# Patient Record
Sex: MALE | Race: WHITE | Employment: UNEMPLOYED | URBAN - METROPOLITAN AREA
[De-identification: names, ages, dates, MRNs, and addresses within clinical notes are randomized per-mention and may not be internally consistent; named-entity substitution may affect disease eponyms.]

---

## 2024-10-17 ENCOUNTER — HOSPITAL ENCOUNTER (INPATIENT)
Facility: HOSPITAL | Age: 39
LOS: 4 days | Discharge: HOME | End: 2024-10-21
Attending: EMERGENCY MEDICINE | Admitting: HOSPITALIST
Payer: COMMERCIAL

## 2024-10-17 ENCOUNTER — APPOINTMENT (OUTPATIENT)
Dept: GENERAL RADIOLOGY | Facility: HOSPITAL | Age: 39
End: 2024-10-17
Attending: EMERGENCY MEDICINE
Payer: COMMERCIAL

## 2024-10-17 DIAGNOSIS — R07.9 CHEST PAIN OF UNCERTAIN ETIOLOGY: Primary | ICD-10-CM

## 2024-10-17 DIAGNOSIS — F10.10 ALCOHOL ABUSE: ICD-10-CM

## 2024-10-17 PROBLEM — F10.939 ALCOHOL WITHDRAWAL (HCC): Status: ACTIVE | Noted: 2024-10-17

## 2024-10-17 LAB
ALBUMIN SERPL-MCNC: 5 G/DL (ref 3.2–4.8)
ALP LIVER SERPL-CCNC: 109 U/L
ALT SERPL-CCNC: 41 U/L
ANION GAP SERPL CALC-SCNC: 15 MMOL/L (ref 0–18)
AST SERPL-CCNC: 44 U/L (ref ?–34)
ATRIAL RATE: 92 BPM
BASOPHILS # BLD AUTO: 0.05 X10(3) UL (ref 0–0.2)
BASOPHILS NFR BLD AUTO: 0.3 %
BILIRUB DIRECT SERPL-MCNC: 0.3 MG/DL (ref ?–0.3)
BILIRUB SERPL-MCNC: 0.8 MG/DL (ref 0.3–1.2)
BUN BLD-MCNC: 12 MG/DL (ref 9–23)
BUN/CREAT SERPL: 10.7 (ref 10–20)
CALCIUM BLD-MCNC: 9.4 MG/DL (ref 8.7–10.4)
CHLORIDE SERPL-SCNC: 104 MMOL/L (ref 98–112)
CO2 SERPL-SCNC: 22 MMOL/L (ref 21–32)
CREAT BLD-MCNC: 1.12 MG/DL
DEPRECATED RDW RBC AUTO: 42.9 FL (ref 35.1–46.3)
EGFRCR SERPLBLD CKD-EPI 2021: 85 ML/MIN/1.73M2 (ref 60–?)
EOSINOPHIL # BLD AUTO: 0.06 X10(3) UL (ref 0–0.7)
EOSINOPHIL NFR BLD AUTO: 0.4 %
ERYTHROCYTE [DISTWIDTH] IN BLOOD BY AUTOMATED COUNT: 13.2 % (ref 11–15)
GLUCOSE BLD-MCNC: 111 MG/DL (ref 70–99)
HCT VFR BLD AUTO: 48.3 %
HGB BLD-MCNC: 17 G/DL
IMM GRANULOCYTES # BLD AUTO: 0.06 X10(3) UL (ref 0–1)
IMM GRANULOCYTES NFR BLD: 0.4 %
LIPASE SERPL-CCNC: 50 U/L (ref 12–53)
LYMPHOCYTES # BLD AUTO: 3.89 X10(3) UL (ref 1–4)
LYMPHOCYTES NFR BLD AUTO: 25.9 %
MAGNESIUM SERPL-MCNC: 2.2 MG/DL (ref 1.6–2.6)
MCH RBC QN AUTO: 30.9 PG (ref 26–34)
MCHC RBC AUTO-ENTMCNC: 35.2 G/DL (ref 31–37)
MCV RBC AUTO: 87.8 FL
MONOCYTES # BLD AUTO: 1.21 X10(3) UL (ref 0.1–1)
MONOCYTES NFR BLD AUTO: 8 %
NEUTROPHILS # BLD AUTO: 9.77 X10 (3) UL (ref 1.5–7.7)
NEUTROPHILS # BLD AUTO: 9.77 X10(3) UL (ref 1.5–7.7)
NEUTROPHILS NFR BLD AUTO: 65 %
OSMOLALITY SERPL CALC.SUM OF ELEC: 292 MOSM/KG (ref 275–295)
P AXIS: 47 DEGREES
P-R INTERVAL: 146 MS
PLATELET # BLD AUTO: 349 10(3)UL (ref 150–450)
POTASSIUM SERPL-SCNC: 4.1 MMOL/L (ref 3.5–5.1)
PROT SERPL-MCNC: 8.1 G/DL (ref 5.7–8.2)
Q-T INTERVAL: 372 MS
QRS DURATION: 84 MS
QTC CALCULATION (BEZET): 460 MS
R AXIS: 67 DEGREES
RBC # BLD AUTO: 5.5 X10(6)UL
SODIUM SERPL-SCNC: 141 MMOL/L (ref 136–145)
T AXIS: 60 DEGREES
TROPONIN I SERPL HS-MCNC: 6 NG/L
TROPONIN I SERPL HS-MCNC: 7 NG/L
VENTRICULAR RATE: 92 BPM
WBC # BLD AUTO: 15 X10(3) UL (ref 4–11)

## 2024-10-17 PROCEDURE — 71045 X-RAY EXAM CHEST 1 VIEW: CPT | Performed by: EMERGENCY MEDICINE

## 2024-10-17 PROCEDURE — 99222 1ST HOSP IP/OBS MODERATE 55: CPT | Performed by: HOSPITALIST

## 2024-10-17 RX ORDER — LORAZEPAM 2 MG/ML
6 INJECTION INTRAMUSCULAR EVERY 10 MIN PRN
Status: DISCONTINUED | OUTPATIENT
Start: 2024-10-17 | End: 2024-10-19

## 2024-10-17 RX ORDER — LORAZEPAM 1 MG/1
3 TABLET ORAL
Status: DISCONTINUED | OUTPATIENT
Start: 2024-10-17 | End: 2024-10-19

## 2024-10-17 RX ORDER — LORAZEPAM 2 MG/ML
5 INJECTION INTRAMUSCULAR
Status: DISCONTINUED | OUTPATIENT
Start: 2024-10-17 | End: 2024-10-19

## 2024-10-17 RX ORDER — DOXEPIN HYDROCHLORIDE 50 MG/1
1 CAPSULE ORAL DAILY
Status: DISCONTINUED | OUTPATIENT
Start: 2024-10-17 | End: 2024-10-21

## 2024-10-17 RX ORDER — LORAZEPAM 2 MG/ML
1 INJECTION INTRAMUSCULAR
Status: DISCONTINUED | OUTPATIENT
Start: 2024-10-17 | End: 2024-10-19

## 2024-10-17 RX ORDER — FOLIC ACID 1 MG/1
1 TABLET ORAL DAILY
Status: DISCONTINUED | OUTPATIENT
Start: 2024-10-17 | End: 2024-10-21

## 2024-10-17 RX ORDER — ACETAMINOPHEN 500 MG
500 TABLET ORAL EVERY 4 HOURS PRN
Status: DISCONTINUED | OUTPATIENT
Start: 2024-10-17 | End: 2024-10-21

## 2024-10-17 RX ORDER — MELATONIN
100 DAILY
Status: DISCONTINUED | OUTPATIENT
Start: 2024-10-18 | End: 2024-10-21

## 2024-10-17 RX ORDER — LORAZEPAM 2 MG/ML
2 INJECTION INTRAMUSCULAR
Status: DISCONTINUED | OUTPATIENT
Start: 2024-10-17 | End: 2024-10-19

## 2024-10-17 RX ORDER — ONDANSETRON 2 MG/ML
4 INJECTION INTRAMUSCULAR; INTRAVENOUS EVERY 6 HOURS PRN
Status: DISCONTINUED | OUTPATIENT
Start: 2024-10-17 | End: 2024-10-18

## 2024-10-17 RX ORDER — KETOROLAC TROMETHAMINE 15 MG/ML
15 INJECTION, SOLUTION INTRAMUSCULAR; INTRAVENOUS ONCE
Status: COMPLETED | OUTPATIENT
Start: 2024-10-17 | End: 2024-10-17

## 2024-10-17 RX ORDER — FAMOTIDINE 10 MG/ML
20 INJECTION, SOLUTION INTRAVENOUS ONCE
Status: COMPLETED | OUTPATIENT
Start: 2024-10-17 | End: 2024-10-17

## 2024-10-17 RX ORDER — THIAMINE HYDROCHLORIDE 100 MG/ML
100 INJECTION, SOLUTION INTRAMUSCULAR; INTRAVENOUS ONCE
Status: COMPLETED | OUTPATIENT
Start: 2024-10-17 | End: 2024-10-17

## 2024-10-17 RX ORDER — PROPRANOLOL HYDROCHLORIDE 10 MG/1
10 TABLET ORAL 2 TIMES DAILY
COMMUNITY

## 2024-10-17 RX ORDER — LORAZEPAM 2 MG/ML
3 INJECTION INTRAMUSCULAR
Status: DISCONTINUED | OUTPATIENT
Start: 2024-10-17 | End: 2024-10-19

## 2024-10-17 RX ORDER — PROPRANOLOL HYDROCHLORIDE 10 MG/1
10 TABLET ORAL 2 TIMES DAILY
Status: DISCONTINUED | OUTPATIENT
Start: 2024-10-17 | End: 2024-10-21

## 2024-10-17 RX ORDER — METOPROLOL TARTRATE 25 MG/1
25 TABLET, FILM COATED ORAL 2 TIMES DAILY PRN
Status: DISCONTINUED | OUTPATIENT
Start: 2024-10-17 | End: 2024-10-20

## 2024-10-17 RX ORDER — LORAZEPAM 0.5 MG/1
1 TABLET ORAL
Status: DISCONTINUED | OUTPATIENT
Start: 2024-10-17 | End: 2024-10-20

## 2024-10-17 RX ORDER — LORAZEPAM 2 MG/ML
4 INJECTION INTRAMUSCULAR EVERY 30 MIN PRN
Status: DISCONTINUED | OUTPATIENT
Start: 2024-10-17 | End: 2024-10-19

## 2024-10-17 RX ORDER — LORAZEPAM 0.5 MG/1
2 TABLET ORAL
Status: DISCONTINUED | OUTPATIENT
Start: 2024-10-17 | End: 2024-10-19

## 2024-10-17 RX ORDER — ENOXAPARIN SODIUM 100 MG/ML
40 INJECTION SUBCUTANEOUS DAILY
Status: DISCONTINUED | OUTPATIENT
Start: 2024-10-17 | End: 2024-10-21

## 2024-10-17 RX ORDER — LORAZEPAM 2 MG/ML
2 INJECTION INTRAMUSCULAR ONCE
Status: COMPLETED | OUTPATIENT
Start: 2024-10-17 | End: 2024-10-17

## 2024-10-17 RX ORDER — PROCHLORPERAZINE EDISYLATE 5 MG/ML
5 INJECTION INTRAMUSCULAR; INTRAVENOUS EVERY 8 HOURS PRN
Status: DISCONTINUED | OUTPATIENT
Start: 2024-10-17 | End: 2024-10-21

## 2024-10-17 RX ORDER — LORAZEPAM 0.5 MG/1
1 TABLET ORAL
Status: DISCONTINUED | OUTPATIENT
Start: 2024-10-17 | End: 2024-10-19

## 2024-10-17 NOTE — ED INITIAL ASSESSMENT (HPI)
Pt presented to ED c/o chest pain with lightheadedness, nausea, vomiting. Lower abdominal pain. EMS stated pt was staying at a motel and drank 2 pints vodka and began to vomit and began to experience chest pain since 0800 hrs this morning. EMS gave 324 mg ASA and 1 tab of sublingual nitroglycerin.

## 2024-10-17 NOTE — ED QUICK NOTES
Orders for admission, patient is aware of plan and ready to go upstairs. Any questions, please call ED RN Kimberly at extension [phone].     Patient Covid vaccination status: Unvaccinated     COVID Test Ordered in ED: None    COVID Suspicion at Admission: N/A    Running Infusions:  None    Mental Status/LOC at time of transport: A/Ox4. Family at bedside. Independent.     Other pertinent information:   CIWA score: 13   NIH score:  N/A

## 2024-10-17 NOTE — CM/SW NOTE
10/17/24 1520   SDOH Outreach Status   SDOH Outreach Status Complete   SDOH Referred for Transportation;Food Resource;Utilities;Stress/Depression/Anxiety;Tobacco/Alcohol/Substance   SDOH Resource Details   SDOH Resource (Transportation) [facility], [facility]   SDOH Resource (Food) [employer], [employer], [facility], [facility], [facility] Food Pantry, [employer], [facility], [employer], SNAP, TANF   SDOH Resource (Utilities) [employer], TANF, [employer], [facility] Housing Forward, [facility], [place] [facility] [employer]   SDOH Resource (Stress/Mental Health) Psych liaison on consult   SDOH Resource (Tobacco/Alcohol/Substance) [place] Quit Hotline, [employer] Help, [employer]       SW received MDO for SDOH & DC Planning. Per chart, pt indicated needs for Food Insecurity, Transportation, Utilities, Stress/Depression, and Tobacco use.    KYLE printed all the above resources from [employer] and provided to pt at bedside.    Address verified on Facesheet. Pt confirmed living w/ his mother.  Pt confirmed being completely independent w/ ambulation. Pt reports he still works Full Time. Pt denies driving at this time.    Pt confirmed no other needs currently. SW provided him w/ information on how to reach SW while at [facility] if other needs arise.      MS TejalW, LSW [phone]

## 2024-10-17 NOTE — H&P
[facility]    PATIENT'S NAME: AJ AVITIA   ATTENDING PHYSICIAN: Albertina Conteh MD   PATIENT ACCOUNT#:   [id number]    LOCATION:  [address]  MEDICAL RECORD #:   [id number]       YOB: 1985  ADMISSION DATE:       10/17/2024    HISTORY AND PHYSICAL EXAMINATION    CHIEF COMPLAINT:  Alcohol abuse and alcohol withdrawal.    HISTORY OF PRESENT ILLNESS:  Patient is a 39-year-old  male who presented to the emergency department for evaluation of midsternal chest pain.  He has been on drinking binge since he was released from an alcohol rehab facility 3 days ago.  He has been consuming, according to his report, around 1 gallon of vodka on a daily basis.  Last drink was this morning.  Today, came into the emergency department for alcohol withdrawal but instead he told the ER physician that he has chest pain and he had a stent in the past.  Later on, he retreated his statement, and he was lying about the stent and the chest discomfort.  CBC and chemistry, lipase and troponin were unremarkable except for leukocytosis of 15.  Chest x-ray showed no acute findings.  EKG showed normal sinus rhythm.       PAST MEDICAL HISTORY:  Alcoholism and hypertension.    PAST SURGICAL HISTORY:  None.     ALLERGIES:  No known drug allergies.    FAMILY HISTORY:  Father had alcoholism.     SOCIAL HISTORY:  Smokes 1 pack a day.  No drug use.  Drinks large amount of alcohol on a daily basis, prolonged history of alcoholism.      REVIEW OF SYSTEMS:  Patient is a poor historian, but he reported that he has been feeling jittery and a bit tremulous for the last few hours.  His last drink was this morning.  He has been on a drinking binge for the last 3 days, consuming 1 gallon of vodka daily.  Other 12-point review of systems is negative.       PHYSICAL EXAMINATION:    GENERAL:  Alert and oriented to time, place and person.  A bit anxious.  No acute distress.   VITAL SIGNS:  Temperature 97.9, pulse 96,  respiratory rate 10, blood pressure 147/103, pulse ox 96% on room air.  HEENT:  Atraumatic.  Oropharynx clear.  Dry mucous membranes.  Normal hard and soft palate.  Eyes:  Anicteric sclerae.   NECK:  Supple.  No lymphadenopathy.  Trachea midline.  Full range of motion.   LUNGS:  Clear to auscultation bilaterally.  Normal respiratory effort.    HEART:  Regular rate and rhythm.  S1 and S2 auscultated.  Slightly tachycardic.   ABDOMEN:  Soft, nondistended.  No tenderness.  Positive bowel sounds.   EXTREMITIES:  No peripheral edema, clubbing or cyanosis.   NEUROLOGIC:  Motor and sensory intact.      ASSESSMENT:    1.   Alcohol abuse with high risk for alcohol withdrawal.  2.   Essential hypertension.    PLAN:  Patient will be admitted to general medical floor.  Detox protocol.   evaluation.  According to his guarantor at the bedside, patient is scheduled for inpatient alcohol rehab and transfer to [place] in the next few days.  We will obtain  to evaluate the situation.  Further recommendations to follow.     Dictated By Tracey Rodrigues MD  d: 10/17/2024 11:39:49  t: 10/17/2024 13:01:00  Job [id number]  /

## 2024-10-17 NOTE — PLAN OF CARE
PtShaheed Sanders is A&Ox 4. Patient is ambulating by himself. Patient is experiencing pain. Continent of bowel and bladder. CIWA protocol in place. PRN ativan given. Call light within reach, fall precautions maintained.    Plan is CIWA and psych consult.    Problem: Patient Centered Care  Goal: Patient preferences are identified and integrated in the patient's plan of care  Description: Interventions:  - Provide timely, complete, and accurate information to patient/family  - Incorporate patient and family knowledge, values, beliefs, and cultural backgrounds into the planning and delivery of care  - Encourage patient/family to participate in care and decision-making at the level they choose  - Honor patient and family perspectives and choices  Outcome: Progressing     Problem: SAFETY ADULT - FALL  Goal: Free from fall injury  Description: INTERVENTIONS:  - Assess pt frequently for physical needs  - Identify cognitive and physical deficits and behaviors that affect risk of falls.  - [place] fall precautions as indicated by assessment.  - Educate pt/family on patient safety including physical limitations  - Instruct pt to call for assistance with activity based on assessment  - Modify environment to reduce risk of injury  - Provide assistive devices as appropriate  - Consider OT/PT consult to assist with strengthening/mobility  - Encourage toileting schedule  Outcome: Progressing     Problem: GASTROINTESTINAL - ADULT  Goal: Minimal or absence of nausea and vomiting  Description: INTERVENTIONS:  - Maintain adequate hydration with IV or PO as ordered and tolerated  - Nasogastric tube to low intermittent suction as ordered  - Evaluate effectiveness of ordered antiemetic medications  - Provide nonpharmacologic comfort measures as appropriate  - Advance diet as tolerated, if ordered  - Obtain nutritional consult as needed  - Evaluate fluid balance  Outcome: Progressing  Goal: Maintains adequate nutritional intake  (undernourished)  Description: INTERVENTIONS:  - Monitor percentage of each meal consumed  - Identify factors contributing to decreased intake, treat as appropriate  - Assist with meals as needed  - Monitor I&O, WT and lab values  - Obtain nutritional consult as needed  - Optimize oral hygiene and moisture  - Encourage food from home; allow for food preferences  - Enhance eating environment  Outcome: Progressing     Problem: METABOLIC/FLUID AND ELECTROLYTES - ADULT  Goal: Electrolytes maintained within normal limits  Description: INTERVENTIONS:  - Monitor labs and rhythm and assess patient for signs and symptoms of electrolyte imbalances  - Administer electrolyte replacement as ordered  - Monitor response to electrolyte replacements, including rhythm and repeat lab results as appropriate  - Fluid restriction as ordered  - Instruct patient on fluid and nutrition restrictions as appropriate  Outcome: Progressing

## 2024-10-17 NOTE — ED PROVIDER NOTES
Patient Seen in: [facility] Emergency Department      History     Chief Complaint   Patient presents with    Abdomen/Flank Pain    Chest Pain     Stated Complaint: chest pain    Subjective:   HPI      39-year-old male who reports history of MI 6 months ago presents to the emergency department for evaluation of chest pain.  Patient arrives via EMS, reports chest pain that began early this morning.  Patient reports last night he drank 2 pints of vodka, vomited and chest pain started shortly after this.  Patient reports this is associated with shortness of breath as well as diaphoresis.  He states he had cardiac stents placed at [facility], has not been on any anticoagulant medication since.  He is a daily drinker, reports he has withdrawal symptoms when he stops drinking.    Objective:     Past Medical History:    ETOH abuse    MI (myocardial infarction) (HCC)              History reviewed. No pertinent surgical history.             Social History     Socioeconomic History    Marital status: Single   Tobacco Use    Smoking status: Every Day     Current packs/day: 1.00     Average packs/day: 1 pack/day for 20.8 years (20.8 ttl pk-yrs)     Types: Cigarettes     Start date: 2004    Smokeless tobacco: Never   Vaping Use    Vaping status: Never Used   Substance and Sexual Activity    Alcohol use: Yes     Comment: 2 pints of vodka per day. last drink 9am 10/17    Drug use: Never     Social Drivers of Health     Food Insecurity: Food Insecurity Present (10/17/2024)    Food Insecurity     Food Insecurity: Often true   Transportation Needs: Unmet Transportation Needs (10/17/2024)    Transportation Needs     Lack of Transportation: Yes   Housing Stability: Medium Risk (10/17/2024)    Housing Stability     Housing Instability: Yes     Housing Instability Emergency: No                  Physical Exam     ED Triage Vitals   BP 10/17/24 0909 (!) 118/9   Pulse 10/17/24 0909 90   Resp 10/17/24 0909 15   Temp 10/17/24  0916 97.9 °F (36.6 °C)   Temp src 10/17/24 0916 Temporal   SpO2 10/17/24 0909 96 %   O2 Device 10/17/24 0909 None (Room air)       Current Vitals:   Vital Signs  BP: (!) 155/107  Pulse: 108  Resp: 20  Temp: 98.6 °F (37 °C)  Temp src: Oral  MAP (mmHg): (!) 121    Oxygen Therapy  SpO2: 96 %  O2 Device: None (Room air)        Physical Exam  Vitals and nursing note reviewed.   Constitutional:       General: He is not in acute distress.     Appearance: He is well-developed.   HENT:      Head: Normocephalic and atraumatic.   Eyes:      Conjunctiva/sclera: Conjunctivae normal.   Cardiovascular:      Rate and Rhythm: Normal rate and regular rhythm.      Heart sounds: Normal heart sounds.   Pulmonary:      Effort: Pulmonary effort is normal. No respiratory distress.      Breath sounds: Normal breath sounds.   Abdominal:      General: Bowel sounds are normal.      Palpations: Abdomen is soft.      Tenderness: There is no abdominal tenderness.   Musculoskeletal:         General: Normal range of motion.      Cervical back: Normal range of motion and neck supple.   Skin:     General: Skin is warm and dry.      Findings: No rash.   Neurological:      General: No focal deficit present.      Mental Status: He is alert and oriented to person, place, and time.             ED Course     Labs Reviewed   CBC WITH DIFFERENTIAL WITH PLATELET - Abnormal; Notable for the following components:       Result Value    WBC 15.0 (*)     Neutrophil Absolute Prelim 9.77 (*)     Neutrophil Absolute 9.77 (*)     Monocyte Absolute 1.21 (*)     All other components within normal limits   HEPATIC FUNCTION PANEL (7) - Abnormal; Notable for the following components:    AST 44 (*)     Albumin 5.0 (*)     All other components within normal limits   BASIC METABOLIC PANEL (8) - Abnormal; Notable for the following components:    Glucose 111 (*)     All other components within normal limits   LIPASE - Normal   TROPONIN I HIGH SENSITIVITY - Normal   TROPONIN I  HIGH SENSITIVITY - Normal   MAGNESIUM - Normal   RAINBOW DRAW LAVENDER   RAINBOW DRAW LIGHT GREEN   RAINBOW DRAW BLUE   C. DIFFICILE(TOXIGENIC)PCR     EKG    Rate, intervals and axes as noted on EKG Report.  Rate: 92  Rhythm: Sinus Rhythm  Reading: Sinus rhythm, no STEMI                Imaging Results Available and Reviewed while in ED:   XR CHEST AP PORTABLE  (CPT=71045)   Final Result   PROCEDURE: XR CHEST AP PORTABLE  (CPT=71045)   TIME: 0959 hours.         COMPARISON: None.       INDICATIONS: Shortness of breath and chest pain x 1 day.       TECHNIQUE:   Single view.         FINDINGS:    CARDIAC/VASC: Normal.  No cardiac silhouette abnormality or cardiomegaly.     Unremarkable pulmonary vasculature.    MEDIAST/RIAN:   No visible mass or adenopathy.   LUNGS/PLEURA: Normal.  No significant pulmonary parenchymal abnormalities.     No effusion or pleural thickening.   BONES: No fracture or visible bony lesion.   OTHER: Negative.                     =====   CONCLUSION: No radiographic evidence of acute cardiopulmonary abnormality.               Dictated by (CST): Lj Graf MD on 10/17/2024 at 10:25 AM        Finalized by (CST): Lj Graf MD on 10/17/2024 at 10:26 AM                   ED Medications Administered:   Medications   enoxaparin (Lovenox) 40 MG/0.4ML SUBQ injection 40 mg (40 mg Subcutaneous Given 10/17/24 1422)   acetaminophen (Tylenol Extra Strength) tab 500 mg (has no administration in time range)   ondansetron (Zofran) 4 MG/2ML injection 4 mg (has no administration in time range)   prochlorperazine (Compazine) 10 MG/2ML injection 5 mg (has no administration in time range)   LORazepam (Ativan) tab 1 mg (has no administration in time range)     Or   LORazepam (Ativan) tab 2 mg (has no administration in time range)   metoprolol tartrate (Lopressor) tab 25 mg (has no administration in time range)   thiamine (Vitamin B1) tab 100 mg (has no administration in time range)   multivitamin (Tab-A-Debbie/Beta  Carotene) tab 1 tablet (1 tablet Oral Given 10/17/24 1420)   folic acid (Folvite) tab 1 mg (1 mg Oral Given 10/17/24 1420)   influenza virus trivalent PF (Fluzone Trivalent) 0.5 mL IM injection (ages 6 months to 64 years) 0.5 mL (has no administration in time range)   propranolol (Inderal) tab 10 mg (10 mg Oral Given 10/17/24 1422)   LORazepam (Ativan) tab 1 mg (has no administration in time range)     Or   LORazepam (Ativan) 2 mg/mL injection 1 mg (has no administration in time range)   LORazepam (Ativan) tab 2 mg (has no administration in time range)     Or   LORazepam (Ativan) 2 mg/mL injection 2 mg (has no administration in time range)   LORazepam (Ativan) tab 3 mg ( Oral See Alternative 10/17/24 1413)     Or   LORazepam (Ativan) 2 mg/mL injection 3 mg (3 mg Intravenous Given 10/17/24 1413)   LORazepam (Ativan) 2 mg/mL injection 4 mg (has no administration in time range)   LORazepam (Ativan) 2 mg/mL injection 5 mg (has no administration in time range)   LORazepam (Ativan) 2 mg/mL injection 6 mg (has no administration in time range)   famotidine (Pepcid) 20 mg/2mL injection 20 mg (20 mg Intravenous Given 10/17/24 1107)   ketorolac (Toradol) 15 MG/ML injection 15 mg (15 mg Intravenous Given 10/17/24 1137)   thiamine 100 mg/mL injection 100 mg (100 mg Intravenous Given 10/17/24 1424)   LORazepam (Ativan) 2 mg/mL injection 2 mg (2 mg Intravenous Given 10/17/24 1203)       Vitals:    10/17/24 1227 10/17/24 1341 10/17/24 1343 10/17/24 1436   BP: (!) 128/93  (!) 142/92 (!) 155/107   BP Location:   Right arm Right arm   Pulse: 107  102 108   Resp: 21  20    Temp:   98.6 °F (37 °C)    TempSrc:   Oral    SpO2: 96%  96%    Weight:  84.7 kg     Height:  170.2 cm (5' 7\")       *I personally reviewed and interpreted all ED vitals.         [facility]          Admission disposition: 10/17/2024 11:14 AM           Medical Decision Making  Differential diagnosis includes but is not limited to gastritis, peptic ulcer disease,  pancreatitis, ACS, aortic aneurysm or dissection    Well-appearing patient, labs within normal limits, patient reports he still has small amount of central chest discomfort, given Toradol.  Troponin is negative, will get repeat.  Given history of recent cardiac stent without antiplatelet agent discussed plan for admission for further evaluation and monitoring.  Will need continuous monitoring for alcohol withdrawal, CIWA ordered.  Discussed with and admitted to Dr. Rodrigues.    Problems Addressed:  Alcohol abuse: chronic illness or injury with exacerbation, progression, or side effects of treatment  Chest pain of uncertain etiology: acute illness or injury    Amount and/or Complexity of Data Reviewed  Independent Historian: EMS  External Data Reviewed: labs.     Details: CBC and CMP from 7/6/2024 reviewed, normal CBC, chloride 109, otherwise BMP within normal limits, creatinine 0.95  Labs: ordered.  Radiology: ordered and independent interpretation performed.     Details: Chest x-ray image reviewed, no obvious pneumothorax, other incidental findings deferred to radiology  ECG/medicine tests: ordered and independent interpretation performed. Decision-making details documented in ED Course.  Discussion of management or test interpretation with external provider(s): Discussed with hospitalist        Disposition and Plan     Clinical Impression:  1. Chest pain of uncertain etiology    2. Alcohol abuse         Disposition:  Admit  10/17/2024 11:14 am    Follow-up:  No follow-up provider specified.        Medications Prescribed:  Current Discharge Medication List              Supplementary Documentation:         Hospital Problems       Present on Admission             ICD-10-CM Noted POA    * (Principal) Chest pain of uncertain etiology R07.9 10/17/2024 Unknown    Alcohol abuse F10.10 10/17/2024 Unknown    Alcohol withdrawal (HCC) F10.939 10/17/2024 Unknown

## 2024-10-18 PROBLEM — F10.20 ALCOHOL DEPENDENCE, CONTINUOUS (HCC): Status: ACTIVE | Noted: 2024-10-18

## 2024-10-18 PROBLEM — F31.32 MODERATE BIPOLAR I DISORDER, MOST RECENT EPISODE DEPRESSED (HCC): Status: ACTIVE | Noted: 2024-10-18

## 2024-10-18 PROBLEM — F10.930 ALCOHOL WITHDRAWAL SYNDROME, UNCOMPLICATED (HCC): Status: ACTIVE | Noted: 2024-10-17

## 2024-10-18 LAB
ALBUMIN SERPL-MCNC: 4.7 G/DL (ref 3.2–4.8)
ALBUMIN/GLOB SERPL: 1.7 {RATIO} (ref 1–2)
ALP LIVER SERPL-CCNC: 103 U/L
ALT SERPL-CCNC: 76 U/L
ANION GAP SERPL CALC-SCNC: 9 MMOL/L (ref 0–18)
AST SERPL-CCNC: 69 U/L (ref ?–34)
BASOPHILS # BLD AUTO: 0.04 X10(3) UL (ref 0–0.2)
BASOPHILS NFR BLD AUTO: 0.4 %
BILIRUB SERPL-MCNC: 1.7 MG/DL (ref 0.3–1.2)
BUN BLD-MCNC: 19 MG/DL (ref 9–23)
BUN/CREAT SERPL: 17.6 (ref 10–20)
CALCIUM BLD-MCNC: 9.5 MG/DL (ref 8.7–10.4)
CHLORIDE SERPL-SCNC: 104 MMOL/L (ref 98–112)
CO2 SERPL-SCNC: 27 MMOL/L (ref 21–32)
CREAT BLD-MCNC: 1.08 MG/DL
DEPRECATED RDW RBC AUTO: 42.5 FL (ref 35.1–46.3)
EGFRCR SERPLBLD CKD-EPI 2021: 89 ML/MIN/1.73M2 (ref 60–?)
EOSINOPHIL # BLD AUTO: 0.17 X10(3) UL (ref 0–0.7)
EOSINOPHIL NFR BLD AUTO: 1.5 %
ERYTHROCYTE [DISTWIDTH] IN BLOOD BY AUTOMATED COUNT: 12.9 % (ref 11–15)
GLOBULIN PLAS-MCNC: 2.7 G/DL (ref 2–3.5)
GLUCOSE BLD-MCNC: 107 MG/DL (ref 70–99)
HCT VFR BLD AUTO: 46.3 %
HGB BLD-MCNC: 16.1 G/DL
IMM GRANULOCYTES # BLD AUTO: 0.05 X10(3) UL (ref 0–1)
IMM GRANULOCYTES NFR BLD: 0.4 %
LYMPHOCYTES # BLD AUTO: 2.27 X10(3) UL (ref 1–4)
LYMPHOCYTES NFR BLD AUTO: 20 %
MCH RBC QN AUTO: 31.1 PG (ref 26–34)
MCHC RBC AUTO-ENTMCNC: 34.8 G/DL (ref 31–37)
MCV RBC AUTO: 89.6 FL
MONOCYTES # BLD AUTO: 1.55 X10(3) UL (ref 0.1–1)
MONOCYTES NFR BLD AUTO: 13.6 %
NEUTROPHILS # BLD AUTO: 7.28 X10 (3) UL (ref 1.5–7.7)
NEUTROPHILS # BLD AUTO: 7.28 X10(3) UL (ref 1.5–7.7)
NEUTROPHILS NFR BLD AUTO: 64.1 %
OSMOLALITY SERPL CALC.SUM OF ELEC: 293 MOSM/KG (ref 275–295)
PLATELET # BLD AUTO: 274 10(3)UL (ref 150–450)
POTASSIUM SERPL-SCNC: 3.9 MMOL/L (ref 3.5–5.1)
PROT SERPL-MCNC: 7.4 G/DL (ref 5.7–8.2)
RBC # BLD AUTO: 5.17 X10(6)UL
SODIUM SERPL-SCNC: 140 MMOL/L (ref 136–145)
WBC # BLD AUTO: 11.4 X10(3) UL (ref 4–11)

## 2024-10-18 PROCEDURE — 90792 PSYCH DIAG EVAL W/MED SRVCS: CPT | Performed by: OTHER

## 2024-10-18 PROCEDURE — 99233 SBSQ HOSP IP/OBS HIGH 50: CPT | Performed by: HOSPITALIST

## 2024-10-18 RX ORDER — QUETIAPINE FUMARATE 25 MG/1
50 TABLET, FILM COATED ORAL NIGHTLY
Status: DISCONTINUED | OUTPATIENT
Start: 2024-10-18 | End: 2024-10-19

## 2024-10-18 RX ORDER — ARIPIPRAZOLE 2 MG/1
2 TABLET ORAL DAILY
Status: DISCONTINUED | OUTPATIENT
Start: 2024-10-18 | End: 2024-10-19

## 2024-10-18 RX ORDER — GABAPENTIN 100 MG/1
100 CAPSULE ORAL 3 TIMES DAILY
Status: DISCONTINUED | OUTPATIENT
Start: 2024-10-18 | End: 2024-10-21

## 2024-10-18 RX ORDER — PANTOPRAZOLE SODIUM 40 MG/1
40 TABLET, DELAYED RELEASE ORAL
Status: DISCONTINUED | OUTPATIENT
Start: 2024-10-18 | End: 2024-10-21

## 2024-10-18 NOTE — DIETARY NOTE
ADULT NUTRITION INITIAL ASSESSMENT    Pt is at moderate nutrition risk.  Pt does not meet malnutrition criteria.      RECOMMENDATIONS TO MD: See Nutrition Intervention    ADMITTING DIAGNOSIS:  Alcohol abuse [F10.10]  Chest pain of uncertain etiology [R07.9]  PERTINENT PAST MEDICAL HISTORY:   Past Medical History:    ETOH abuse    MI (myocardial infarction) (HCC)     PATIENT STATUS: Initial 10/18/24: Pt assessed due to screening at risk for MST of 4 for wt loss and declined PO intake. Pt with known h/o ETOH abuse. Was just released from rehab and has been on a drinking binge since (3 days) drinking ~1 gallon of vodka daily per pt reports in chart notes. No wt hx to review. Few meal intakes of adequate intakes recorded. Visited pt at bedside. Pt reports loss of ~20# over the past ~2 weeks d/t very poor PO intake. Unable to confirm this with limited wt hx review, but true wt loss of 20# x 14 days is unlikely. Pt states he has been eating very poorly for the past few months, ~1 meal daily but feeling sick every time he eats. Pt agreeable to strawberry flavored ONS during admission to encourage intakes. Pt is well-nourished.     FOOD/NUTRITION RELATED HISTORY:  Appetite: Poor per pt PTA  Intake:  % x 2 meals so far. Lunch tray seen on tray table in room with most of food eaten.   Intake Meeting Needs: Marginal, added oral nutrition supplements (ONS)  Percent Meals Eaten (last 6 days)       Date/Time Percent Meals Eaten (%)    10/17/24 1502 100 %    10/18/24 1216 75 %            Food Allergies: No Known Food Allergies (NKFA)  Cultural/Ethnic/Protestant Preferences: None    GASTROINTESTINAL: nausea and vomiting per pt    MEDICATIONS: reviewed   pantoprazole  40 mg Oral QAM AC    gabapentin  100 mg Oral TID    QUEtiapine  50 mg Oral Nightly    ARIPiprazole  2 mg Oral Daily    enoxaparin  40 mg Subcutaneous Daily    thiamine  100 mg Oral Daily    multivitamin  1 tablet Oral Daily    folic acid  1 mg Oral Daily     propranolol  10 mg Oral BID     LABS: reviewed  Recent Labs     10/17/24  0902 10/17/24  1104 10/18/24  0617   *  --  107*   BUN 12  --  19   CREATSERUM 1.12  --  1.08   CA 9.4  --  9.5   MG  --  2.2  --      --  140   K 4.1  --  3.9     --  104   CO2 22.0  --  27.0   OSMOCALC 292  --  293       NUTRITION RELATED PHYSICAL FINDINGS:  - Nutrition Focused Physical Exam (NFPE): well nourished  - Fluid Accumulation: none  ---> See RN documentation for details  - Skin Integrity: intact ---> See RN documentation for details    ANTHROPOMETRICS:  HT: 170.2 cm (5' 7\")  WT: 86.4 kg (190 lb 6.4 oz)   BMI: Body mass index is 29.82 kg/m².  BMI CLASSIFICATION: 25-29.9 kg/m2 - overweight  IBW: 148 lbs        128% IBW  Usual Body Wt: 210 lbs per pt      90% UBW    WEIGHT HISTORY:  Patient Weight(s) for the past 336 hrs:   Weight   10/18/24 0500 86.4 kg (190 lb 6.4 oz)   10/17/24 1341 84.7 kg (186 lb 11.2 oz)   10/17/24 0909 93.4 kg (206 lb)     Wt Readings from Last 10 Encounters:   10/18/24 86.4 kg (190 lb 6.4 oz)     NUTRITION DIAGNOSIS/PROBLEM:   Inadequate oral intake related to Psychological causes (ETOH abuse) as evidenced by pt report of very poor PO intake for the past few months.     NUTRITION INTERVENTION:     NUTRITION PRESCRIPTION:   Estimated Nutrition needs: --dosing wt of 86.4 kg - wt taken on 10/18  Calories: 7869-8168 calories/day (20-24 calories per kg Dosing wt)  Protein:  g protein/day (1-1.3 g protein/kg Dosing wt)    - Diet:       Procedures    Cardiac diet Cardiac; Is Patient on Accuchecks? No      - RD Care Plan: Encouraged small frequent meals with emphasis on high calorie/high protein and Initiated ONS (oral nutritional supplements)  - Medical Food Supplements-RD added Ensure Enlive (350 calories/ 20 g protein each) Daily Strawberry. Rational/use of oral supplements discussed.  - Vitamin and mineral supplements: folic acid, multivitamin/mineral, and thiamin  - Feeding assistance:  independent  - Nutrition education: Discussed importance of adequate energy and protein intake     - Coordination of nutrition care: collaboration with other providers  - Discharge and transfer of nutrition care to new setting or provider: monitor plans    MONITOR AND EVALUATE/NUTRITION GOALS:  - Food and Nutrient Intake:      Monitor: adequacy of PO intake and adequacy of supplement intake  - Food and Nutrient Administration:      Monitor: N/A  - Anthropometric Measurement:    Monitor weight  - Nutrition Goals:      halt wt loss, PO and supplement greater than 75% of needs, and support body systems    DIETITIAN FOLLOW UP: RD to follow and monitor nutrition status       Marsha Burroughs RD, LDN  Clinical Dietitian  P: [phone]

## 2024-10-18 NOTE — PLAN OF CARE
Patient is originally from home w/ mom. A&Ox4. Room air. Patient is a self. Bed in lowest position and locked. Call light in hand. Personal belongings w/in reach.     Problem: Patient Centered Care  Goal: Patient preferences are identified and integrated in the patient's plan of care  Description: Interventions:  - What would you like us to know as we care for you? From home w/ mom  - Provide timely, complete, and accurate information to patient/family  - Incorporate patient and family knowledge, values, beliefs, and cultural backgrounds into the planning and delivery of care  - Encourage patient/family to participate in care and decision-making at the level they choose  - Honor patient and family perspectives and choices  Outcome: Progressing     Problem: Patient/Family Goals  Goal: Patient/Family Long Term Goal  Description: Patient's Long Term Goal: To be discharged    Interventions:  - Assist patient to all tests and procedures  - See additional Care Plan goals for specific interventions  Outcome: Progressing  Goal: Patient/Family Short Term Goal  Description: Patient's Short Term Goal: To feel better    Interventions:   - Continue medication administration as ordered  - See additional Care Plan goals for specific interventions  Outcome: Progressing     Problem: SAFETY ADULT - FALL  Goal: Free from fall injury  Description: INTERVENTIONS:  - Assess pt frequently for physical needs  - Identify cognitive and physical deficits and behaviors that affect risk of falls.  - [place] fall precautions as indicated by assessment.  - Educate pt/family on patient safety including physical limitations  - Instruct pt to call for assistance with activity based on assessment  - Modify environment to reduce risk of injury  - Provide assistive devices as appropriate  - Consider OT/PT consult to assist with strengthening/mobility  - Encourage toileting schedule  Outcome: Progressing     Problem: GASTROINTESTINAL - ADULT  Goal:  Minimal or absence of nausea and vomiting  Description: INTERVENTIONS:  - Maintain adequate hydration with IV or PO as ordered and tolerated  - Nasogastric tube to low intermittent suction as ordered  - Evaluate effectiveness of ordered antiemetic medications  - Provide nonpharmacologic comfort measures as appropriate  - Advance diet as tolerated, if ordered  - Obtain nutritional consult as needed  - Evaluate fluid balance  Outcome: Progressing  Goal: Maintains adequate nutritional intake (undernourished)  Description: INTERVENTIONS:  - Monitor percentage of each meal consumed  - Identify factors contributing to decreased intake, treat as appropriate  - Assist with meals as needed  - Monitor I&O, WT and lab values  - Obtain nutritional consult as needed  - Optimize oral hygiene and moisture  - Encourage food from home; allow for food preferences  - Enhance eating environment  Outcome: Progressing     Problem: METABOLIC/FLUID AND ELECTROLYTES - ADULT  Goal: Electrolytes maintained within normal limits  Description: INTERVENTIONS:  - Monitor labs and rhythm and assess patient for signs and symptoms of electrolyte imbalances  - Administer electrolyte replacement as ordered  - Monitor response to electrolyte replacements, including rhythm and repeat lab results as appropriate  - Fluid restriction as ordered  - Instruct patient on fluid and nutrition restrictions as appropriate  Outcome: Progressing

## 2024-10-18 NOTE — SPIRITUAL CARE NOTE
was following up on a consult for support. Pt stated that he was getting some rest and would like a visit next day. Spiritual care will check in on Friday to offer more support. Chaplains are available #[phone]    Staff Chaplain Cordelia Conway

## 2024-10-18 NOTE — PROGRESS NOTES
[facility]  part of [facility]  Hospitalist Progress Note     Srinath Tamayo Patient Status:  Observation    4/10/1985  39 year old CSN [id number]   Location 308/308-A Attending Christophe Wood MD   Hosp Day # 0 PCP No primary care provider on file.     Assessment & Plan:   ----------------------------------  Acute alcohol withdrawal.  Symptoms are mild.  Tremor is not present.  Delirium not present.  History of alcohol withdrawal seizures 20yrs ago present.  -Detox protocol  -Psychiatry consultation if worsens  -Telemetry  -Restraints if needed  -Social work for resources  -Thiamine replacement    Alcohol abuse.  Currently with mild signs or symptoms of withdrawal  -Monitor for tremor, tachycardia, psychosis, anxiety  -Alcohol cessation encouraged  -Social work for resources  -Psychiatry consult if worsens    Alcoholic gastritis.  Mild.  Nausea but no vomiting.  No hematemesis or melena.  -Protonix  -Diet as tolerated    Abnormal LFTs.  Alcohol related.  Bilirubin and transaminases slightly elevated but overall mild.  -Recheck LFTs in the morning    Chest pain.  Initially complained about chest pain in the ER though patient admits that this was not the case.  He also stated that he had stents placed but this was also not true, patient says that he was anxious and started making things up.  -expectant management    DVT Mechanical Prophylaxis:        DVT Pharmacologic Prophylaxis   Medication    enoxaparin (Lovenox) 40 MG/0.4ML SUBQ injection 40 mg              code status: full   dispo: home upon medical clearance  If applicable, malnutrition status at bottom of note    I personally reviewed the available laboratories, imaging including. I discussed/will discuss the case with consultants. I ordered laboratories and/or radiographic studies. I adjusted medications as detailed above.  Medical decision making high, risk is high.    Subjective:   ----------------------------------  Feeling better  overall.  Minimal abdominal pain, improved from yesterday.  Not much appetite.  No tremor.  No hallucinations or delusions.      Objective:   Chief Complaint:   Chief Complaint   Patient presents with    Abdomen/Flank Pain    Chest Pain     ----------------------------------  Temp:  [97.9 °F (36.6 °C)-98.8 °F (37.1 °C)] 98.1 °F (36.7 °C)  Pulse:  [] 74  Resp:  [10-22] 20  BP: (118-155)/(9-107) 142/96  SpO2:  [94 %-98 %] 94 %  Gen: A+Ox3.  No distress.   HEENT: NCAT, neck supple, no carotid bruit.  CV: RRR, S1S2, and intact distal pulses. No gallop, rub, murmur.  Pulm: Effort and breath sounds normal. No distress, wheezes, rales, rhonchi.  Abd: Soft, minimal epigastric tenderness to palpation without rebound or guarding.  Neuro: Normal reflexes, CN. Sensory/motor exams grossly normal deficit.   MS: No joint effusions.  No peripheral edema.  Skin: Skin is warm and dry. No rashes, erythema, diaphoresis.   Psych: Normal mood and affect. Calm, cooperative    Labs:  Lab Results   Component Value Date    HGB 16.1 10/18/2024    WBC 11.4 (H) 10/18/2024    .0 10/18/2024     10/18/2024    K 3.9 10/18/2024    CREATSERUM 1.08 10/18/2024    AST 69 (H) 10/18/2024    ALT 76 (H) 10/18/2024            enoxaparin  40 mg Subcutaneous Daily    thiamine  100 mg Oral Daily    multivitamin  1 tablet Oral Daily    folic acid  1 mg Oral Daily    propranolol  10 mg Oral BID       acetaminophen    ondansetron    prochlorperazine    LORazepam **OR** LORazepam    metoprolol tartrate    influenza virus vaccine PF    LORazepam **OR** LORazepam    LORazepam **OR** LORazepam    LORazepam **OR** LORazepam    LORazepam    LORazepam    LORazepam

## 2024-10-18 NOTE — PLAN OF CARE
Problem: Patient Centered Care  Goal: Patient preferences are identified and integrated in the patient's plan of care  Description: Interventions:  - What would you like us to know as we care for you? From home with mom   - Provide timely, complete, and accurate information to patient/family  - Incorporate patient and family knowledge, values, beliefs, and cultural backgrounds into the planning and delivery of care  - Encourage patient/family to participate in care and decision-making at the level they choose  - Honor patient and family perspectives and choices  Outcome: Progressing     Problem: Patient/Family Goals  Goal: Patient/Family Long Term Goal  Description: Patient's Long Term Goal: to go home    Interventions:  - follow MD orders  - See additional Care Plan goals for specific interventions  Outcome: Progressing  Goal: Patient/Family Short Term Goal  Description: Patient's Short Term Goal: manage n/v    Interventions:   - follow MD orders  - See additional Care Plan goals for specific interventions  Outcome: Progressing     Problem: SAFETY ADULT - FALL  Goal: Free from fall injury  Description: INTERVENTIONS:  - Assess pt frequently for physical needs  - Identify cognitive and physical deficits and behaviors that affect risk of falls.  - [place] fall precautions as indicated by assessment.  - Educate pt/family on patient safety including physical limitations  - Instruct pt to call for assistance with activity based on assessment  - Modify environment to reduce risk of injury  - Provide assistive devices as appropriate  - Consider OT/PT consult to assist with strengthening/mobility  - Encourage toileting schedule  Outcome: Progressing     Problem: GASTROINTESTINAL - ADULT  Goal: Minimal or absence of nausea and vomiting  Description: INTERVENTIONS:  - Maintain adequate hydration with IV or PO as ordered and tolerated  - Nasogastric tube to low intermittent suction as ordered  - Evaluate effectiveness of  ordered antiemetic medications  - Provide nonpharmacologic comfort measures as appropriate  - Advance diet as tolerated, if ordered  - Obtain nutritional consult as needed  - Evaluate fluid balance  Outcome: Progressing  Goal: Maintains adequate nutritional intake (undernourished)  Description: INTERVENTIONS:  - Monitor percentage of each meal consumed  - Identify factors contributing to decreased intake, treat as appropriate  - Assist with meals as needed  - Monitor I&O, WT and lab values  - Obtain nutritional consult as needed  - Optimize oral hygiene and moisture  - Encourage food from home; allow for food preferences  - Enhance eating environment  Outcome: Progressing     Problem: METABOLIC/FLUID AND ELECTROLYTES - ADULT  Goal: Electrolytes maintained within normal limits  Description: INTERVENTIONS:  - Monitor labs and rhythm and assess patient for signs and symptoms of electrolyte imbalances  - Administer electrolyte replacement as ordered  - Monitor response to electrolyte replacements, including rhythm and repeat lab results as appropriate  - Fluid restriction as ordered  - Instruct patient on fluid and nutrition restrictions as appropriate  Outcome: Progressing

## 2024-10-18 NOTE — PAYOR COMM NOTE
--------------  ADMISSION REVIEW     Payor: MARKOS LUBNA  Subscriber #:  [id number]  Authorization Number: [id number]    Admit date: 10/17/24  Admit time:  1:39 PM       REVIEW DOCUMENTATION:     ED Provider Notes        ED Provider Notes signed by Albertina Conteh MD at 10/17/2024  3:46 PM        Patient Seen in: [facility] Emergency Department      History     Chief Complaint   Patient presents with    Abdomen/Flank Pain    Chest Pain     Stated Complaint: chest pain    Subjective:   HPI      39-year-old male who reports history of MI 6 months ago presents to the emergency department for evaluation of chest pain.  Patient arrives via EMS, reports chest pain that began early this morning.  Patient reports last night he drank 2 pints of vodka, vomited and chest pain started shortly after this.  Patient reports this is associated with shortness of breath as well as diaphoresis.  He states he had cardiac stents placed at [facility], has not been on any anticoagulant medication since.  He is a daily drinker, reports he has withdrawal symptoms when he stops drinking.    Objective:     Past Medical History:    ETOH abuse    MI (myocardial infarction) ([facility])     Physical Exam     ED Triage Vitals   BP 10/17/24 0909 (!) 118/9   Pulse 10/17/24 0909 90   Resp 10/17/24 0909 15   Temp 10/17/24 0916 97.9 °F (36.6 °C)   Temp src 10/17/24 0916 Temporal   SpO2 10/17/24 0909 96 %   O2 Device 10/17/24 0909 None (Room air)       Current Vitals:   Vital Signs  BP: (!) 155/107  Pulse: 108  Resp: 20  Temp: 98.6 °F (37 °C)  Temp src: Oral  MAP (mmHg): (!) 121    Oxygen Therapy  SpO2: 96 %  O2 Device: None (Room air)        Physical Exam  Vitals and nursing note reviewed.   Constitutional:       General: He is not in acute distress.     Appearance: He is well-developed.   HENT:      Head: Normocephalic and atraumatic.   Eyes:      Conjunctiva/sclera: Conjunctivae normal.   Cardiovascular:      Rate and Rhythm: Normal rate and  regular rhythm.      Heart sounds: Normal heart sounds.   Pulmonary:      Effort: Pulmonary effort is normal. No respiratory distress.      Breath sounds: Normal breath sounds.   Abdominal:      General: Bowel sounds are normal.      Palpations: Abdomen is soft.      Tenderness: There is no abdominal tenderness.   Musculoskeletal:         General: Normal range of motion.      Cervical back: Normal range of motion and neck supple.   Skin:     General: Skin is warm and dry.      Findings: No rash.   Neurological:      General: No focal deficit present.      Mental Status: He is alert and oriented to person, place, and time.             ED Course     Labs Reviewed   CBC WITH DIFFERENTIAL WITH PLATELET - Abnormal; Notable for the following components:       Result Value    WBC 15.0 (*)     Neutrophil Absolute Prelim 9.77 (*)     Neutrophil Absolute 9.77 (*)     Monocyte Absolute 1.21 (*)     All other components within normal limits   HEPATIC FUNCTION PANEL (7) - Abnormal; Notable for the following components:    AST 44 (*)     Albumin 5.0 (*)     All other components within normal limits   BASIC METABOLIC PANEL (8) - Abnormal; Notable for the following components:    Glucose 111 (*)     All other components within normal limits   LIPASE - Normal   TROPONIN I HIGH SENSITIVITY - Normal   TROPONIN I HIGH SENSITIVITY - Normal   MAGNESIUM - Normal   RAINBOW DRAW LAVENDER   RAINBOW DRAW LIGHT GREEN   RAINBOW DRAW BLUE   C. DIFFICILE(TOXIGENIC)PCR     EKG    Rate, intervals and axes as noted on EKG Report.  Rate: 92  Rhythm: Sinus Rhythm  Reading: Sinus rhythm, no STEMI                Imaging Results Available and Reviewed while in ED:   XR CHEST AP PORTABLE  (CPT=71045)   Final Result   PROCEDURE: XR CHEST AP PORTABLE  (CPT=71045)   TIME: 0959 hours.         COMPARISON: None.       INDICATIONS: Shortness of breath and chest pain x 1 day.       TECHNIQUE:   Single view.         FINDINGS:    CARDIAC/VASC: Normal.  No cardiac  silhouette abnormality or cardiomegaly.     Unremarkable pulmonary vasculature.    MEDIAST/RIAN:   No visible mass or adenopathy.   LUNGS/PLEURA: Normal.  No significant pulmonary parenchymal abnormalities.     No effusion or pleural thickening.   BONES: No fracture or visible bony lesion.   OTHER: Negative.                     =====   CONCLUSION: No radiographic evidence of acute cardiopulmonary abnormality.               Dictated by (CST): Lj Graf MD on 10/17/2024 at 10:25 AM        Finalized by (CST): Lj Graf MD on 10/17/2024 at 10:26 AM                   ED Medications Administered:   Medications   enoxaparin (Lovenox) 40 MG/0.4ML SUBQ injection 40 mg (40 mg Subcutaneous Given 10/17/24 1422)   acetaminophen (Tylenol Extra Strength) tab 500 mg (has no administration in time range)   ondansetron (Zofran) 4 MG/2ML injection 4 mg (has no administration in time range)   prochlorperazine (Compazine) 10 MG/2ML injection 5 mg (has no administration in time range)   LORazepam (Ativan) tab 1 mg (has no administration in time range)     Or   LORazepam (Ativan) tab 2 mg (has no administration in time range)   metoprolol tartrate (Lopressor) tab 25 mg (has no administration in time range)   thiamine (Vitamin B1) tab 100 mg (has no administration in time range)   multivitamin (Tab-A-Debbie/Beta Carotene) tab 1 tablet (1 tablet Oral Given 10/17/24 1420)   folic acid (Folvite) tab 1 mg (1 mg Oral Given 10/17/24 1420)   influenza virus trivalent PF (Fluzone Trivalent) 0.5 mL IM injection (ages 6 months to 64 years) 0.5 mL (has no administration in time range)   propranolol (Inderal) tab 10 mg (10 mg Oral Given 10/17/24 1422)   LORazepam (Ativan) tab 1 mg (has no administration in time range)     Or   LORazepam (Ativan) 2 mg/mL injection 1 mg (has no administration in time range)   LORazepam (Ativan) tab 2 mg (has no administration in time range)     Or   LORazepam (Ativan) 2 mg/mL injection 2 mg (has no administration  in time range)   LORazepam (Ativan) tab 3 mg ( Oral See Alternative 10/17/24 1413)     Or   LORazepam (Ativan) 2 mg/mL injection 3 mg (3 mg Intravenous Given 10/17/24 1413)   LORazepam (Ativan) 2 mg/mL injection 4 mg (has no administration in time range)   LORazepam (Ativan) 2 mg/mL injection 5 mg (has no administration in time range)   LORazepam (Ativan) 2 mg/mL injection 6 mg (has no administration in time range)   famotidine (Pepcid) 20 mg/2mL injection 20 mg (20 mg Intravenous Given 10/17/24 1107)   ketorolac (Toradol) 15 MG/ML injection 15 mg (15 mg Intravenous Given 10/17/24 1137)   thiamine 100 mg/mL injection 100 mg (100 mg Intravenous Given 10/17/24 1424)   LORazepam (Ativan) 2 mg/mL injection 2 mg (2 mg Intravenous Given 10/17/24 1203)       Vitals:    10/17/24 1227 10/17/24 1341 10/17/24 1343 10/17/24 1436   BP: (!) 128/93  (!) 142/92 (!) 155/107   BP Location:   Right arm Right arm   Pulse: 107  102 108   Resp: 21  20    Temp:   98.6 °F (37 °C)    TempSrc:   Oral    SpO2: 96%  96%    Weight:  84.7 kg     Height:  170.2 cm (5' 7\")       *I personally reviewed and interpreted all ED vitals.        [facility]          Admission disposition: 10/17/2024 11:14 AM           Medical Decision Making  Differential diagnosis includes but is not limited to gastritis, peptic ulcer disease, pancreatitis, ACS, aortic aneurysm or dissection    Well-appearing patient, labs within normal limits, patient reports he still has small amount of central chest discomfort, given Toradol.  Troponin is negative, will get repeat.  Given history of recent cardiac stent without antiplatelet agent discussed plan for admission for further evaluation and monitoring.  Will need continuous monitoring for alcohol withdrawal, CIWA ordered.  Discussed with and admitted to Dr. Rodrigues.    Problems Addressed:  Alcohol abuse: chronic illness or injury with exacerbation, progression, or side effects of treatment  Chest pain of uncertain etiology: acute  illness or injury    Amount and/or Complexity of Data Reviewed  Independent Historian: EMS  External Data Reviewed: labs.     Details: CBC and CMP from 7/6/2024 reviewed, normal CBC, chloride 109, otherwise BMP within normal limits, creatinine 0.95  Labs: ordered.  Radiology: ordered and independent interpretation performed.     Details: Chest x-ray image reviewed, no obvious pneumothorax, other incidental findings deferred to radiology  ECG/medicine tests: ordered and independent interpretation performed. Decision-making details documented in ED Course.  Discussion of management or test interpretation with external provider(s): Discussed with hospitalist        Disposition and Plan     Clinical Impression:  1. Chest pain of uncertain etiology    2. Alcohol abuse         Disposition:  Admit  10/17/2024 11:14 am    Hospital Problems       Present on Admission             ICD-10-CM Noted POA    * (Principal) Chest pain of uncertain etiology R07.9 10/17/2024 Unknown    Alcohol abuse F10.10 10/17/2024 Unknown    Alcohol withdrawal (HCC) F10.939 10/17/2024 Unknown             Signed by Albertina Conteh MD on 10/17/2024  3:46 PM           History and Physical    H&P signed by Tracey Rodrigues MD at 10/18/2024  9:55 AM       [facility]     PATIENT'S NAME: AJ AVITIA   ATTENDING PHYSICIAN: Albertina Conteh MD   PATIENT ACCOUNT#:   [id number]    LOCATION:  [place] [address]  MEDICAL RECORD #:   [id number]       YOB: 1985  ADMISSION DATE:       10/17/2024     HISTORY AND PHYSICAL EXAMINATION     CHIEF COMPLAINT:  Alcohol abuse and alcohol withdrawal.     HISTORY OF PRESENT ILLNESS:  Patient is a 39-year-old  male who presented to the emergency department for evaluation of midsternal chest pain.  He has been on drinking binge since he was released from an alcohol rehab facility 3 days ago.  He has been consuming, according to his report, around 1 gallon of vodka on a daily basis.   Last drink was this morning.  Today, came into the emergency department for alcohol withdrawal but instead he told the ER physician that he has chest pain and he had a stent in the past.  Later on, he retreated his statement, and he was lying about the stent and the chest discomfort.  CBC and chemistry, lipase and troponin were unremarkable except for leukocytosis of 15.  Chest x-ray showed no acute findings.  EKG showed normal sinus rhythm.          ASSESSMENT:    1.       Alcohol abuse with high risk for alcohol withdrawal.  2.       Essential hypertension.     PLAN:  Patient will be admitted to general medical floor.  Detox protocol.   evaluation.  According to his guarantor at the bedside, patient is scheduled for inpatient alcohol rehab and transfer to [place] in the next few days.  We will obtain  to evaluate the situation.  Further recommendations to follow.      Dictated By Tracey Rodrigues MD        10/18          Date of Service: 10/18/2024  8:28 AM     Signed         [facility]  [facility]        Assessment & Plan:   ----------------------------------  Acute alcohol withdrawal.  Symptoms are mild.  Tremor is not present.  Delirium not present.  History of alcohol withdrawal seizures 20yrs ago present.  -Detox protocol  -Psychiatry consultation if worsens  -Telemetry  -Restraints if needed  -Social work for resources  -Thiamine replacement     Alcohol abuse.  Currently with mild signs or symptoms of withdrawal  -Monitor for tremor, tachycardia, psychosis, anxiety  -Alcohol cessation encouraged  -Social work for resources  -Psychiatry consult if worsens     Alcoholic gastritis.  Mild.  Nausea but no vomiting.  No hematemesis or melena.  -Protonix  -Diet as tolerated     Abnormal LFTs.  Alcohol related.  Bilirubin and transaminases slightly elevated but overall mild.  -Recheck LFTs in the morning     Chest pain.   Initially complained about chest pain in the ER though patient admits that this was not the case.  He also stated that he had stents placed but this was also not true, patient says that he was anxious and started making things up.  -expectant management     DVT Mechanical Prophylaxis:            DVT Pharmacologic Prophylaxis   Medication    enoxaparin (Lovenox) 40 MG/0.4ML SUBQ injection 40 mg               code status: full   dispo: home upon medical clearance  If applicable, malnutrition status at bottom of note     I personally reviewed the available laboratories, imaging including. I discussed/will discuss the case with consultants. I ordered laboratories and/or radiographic studies. I adjusted medications as detailed above.  Medical decision making high, risk is high.     Subjective:   ----------------------------------  Feeling better overall.  Minimal abdominal pain, improved from yesterday.  Not much appetite.  No tremor.  No hallucinations or delusions.        Objective:   Chief Complaint:       Chief Complaint   Patient presents with    Abdomen/Flank Pain    Chest Pain      ----------------------------------  Temp:  [97.9 °F (36.6 °C)-98.8 °F (37.1 °C)] 98.1 °F (36.7 °C)  Pulse:  [] 74  Resp:  [10-22] 20  BP: (118-155)/(9-107) 142/96  SpO2:  [94 %-98 %] 94 %  Gen: A+Ox3.  No distress.   HEENT: NCAT, neck supple, no carotid bruit.  CV: RRR, S1S2, and intact distal pulses. No gallop, rub, murmur.  Pulm: Effort and breath sounds normal. No distress, wheezes, rales, rhonchi.  Abd: Soft, minimal epigastric tenderness to palpation without rebound or guarding.  Neuro:  Normal reflexes, CN. Sensory/motor exams grossly normal deficit.   MS: No joint effusions.  No peripheral edema.  Skin: Skin is warm and dry. No rashes, erythema, diaphoresis.   Psych:   Normal mood and affect. Calm, cooperative     Labs:        Lab Results   Component Value Date     HGB 16.1 10/18/2024     WBC 11.4 (H) 10/18/2024     PLT  274.0 10/18/2024      10/18/2024     K 3.9 10/18/2024     CREATSERUM 1.08 10/18/2024     AST 69 (H) 10/18/2024     ALT 76 (H) 10/18/2024              enoxaparin  40 mg Subcutaneous Daily    thiamine  100 mg Oral Daily    multivitamin  1 tablet Oral Daily    folic acid  1 mg Oral Daily    propranolol  10 mg Oral BID                       MEDICATIONS ADMINISTERED IN LAST 1 DAY:  enoxaparin (Lovenox) 40 MG/0.4ML SUBQ injection 40 mg       Date Action Dose Route User    10/18/2024 0920 Given 40 mg Subcutaneous (Right Lower Abdomen) Kimberli Ritter RN          folic acid (Folvite) tab 1 mg       Date Action Dose Route User    10/18/2024 0920 Given 1 mg Oral Kimberli Ritter RN          LORazepam (Ativan) tab 1 mg       Date Action Dose Route User    10/17/2024 2123 Given 1 mg Oral Shaniqua Gomez RN    10/17/2024 2013 Given 1 mg Oral Shaniqua Gomez RN    10/17/2024 1834 Given 1 mg Oral Kushal Chauhan RN    10/17/2024 1603 Given 1 mg Oral Kushal Chauhan RN          LORazepam (Ativan) tab 1 mg       Date Action Dose Route User    10/18/2024 0926 Given 1 mg Oral Kimberli Ritter RN          ondansetron (Zofran) 4 MG/2ML injection 4 mg       Date Action Dose Route User    10/17/2024 2014 Given 4 mg Intravenous Shaniqua Gomez RN          pantoprazole (Protonix) DR tab 40 mg       Date Action Dose Route User    10/18/2024 0920 Given 40 mg Oral Kimberli Ritter RN          propranolol (Inderal) tab 10 mg       Date Action Dose Route User    10/18/2024 0920 Given 10 mg Oral Kimberli Ritter RN    10/17/2024 2013 Given 10 mg Oral Shaniqua Gomez RN          multivitamin (Tab-A-Debbie/Beta Carotene) tab 1 tablet       Date Action Dose Route User    10/18/2024 0920 Given 1 tablet Oral Kimberli Ritter RN          thiamine (Vitamin B1) tab 100 mg       Date Action Dose Route User    10/18/2024 0920 Given 100 mg Oral Little, Kimberli, RN            Vitals (last day)       Date/Time Temp Pulse Resp BP SpO2 Weight O2  Device O2 Flow Rate (L/min) [facility]    10/18/24 1400 -- 75 -- 146/101 -- -- -- --     10/18/24 1200 -- 72 -- 145/96 -- -- -- --     10/18/24 1000 -- 68 -- 139/100 -- -- -- --     10/18/24 0900 97.1 °F (36.2 °C) 73 20 152/107 94 % -- None (Room air) --     10/18/24 0600 98.1 °F (36.7 °C) 74 20 142/96 94 % -- None (Room air) --     10/18/24 0500 -- -- -- -- -- 190 lb 6.4 oz (86.4 kg) -- --     10/18/24 0400 -- 59 -- -- -- -- -- --     10/18/24 0200 -- 75 -- -- -- -- -- --     10/18/24 0000 -- 71 -- -- -- -- -- --     10/17/24 2200 -- 73 -- -- -- -- -- --     10/17/24 2100 -- 88 -- 146/97 -- -- -- --     10/17/24 2006 98.8 °F (37.1 °C) 89 20 154/103 95 % -- None (Room air) --     10/17/24 1828 98.7 °F (37.1 °C) 97 22 150/95 95 % -- None (Room air) --     10/17/24 1558 -- 100 -- 137/95 -- -- -- --     10/17/24 1436 -- 108 -- 155/107 -- -- -- --     10/17/24 1343 98.6 °F (37 °C) 102 20 142/92 96 % -- None (Room air) --     10/17/24 1341 -- -- -- -- -- 186 lb 11.2 oz (84.7 kg) -- --     10/17/24 1227 -- 107 21 128/93 96 % -- None (Room air) -- [place]    10/17/24 1215 -- 98 15 128/93 97 % -- None (Room air) -- [place]    10/17/24 1200 -- 90 14 143/87 96 % -- None (Room air) -- [place]    10/17/24 1145 -- 88 16 146/92 97 % -- None (Room air) -- AG    10/17/24 1138 -- 93 -- 137/88 -- -- -- -- AG    10/17/24 1045 -- 84 10 147/103 96 % -- None (Room air) -- AG    10/17/24 0930 -- 96 15 144/88 98 % -- None (Room air) -- AG    10/17/24 0916 97.9 °F (36.6 °C) 85 15 129/96 96 % -- None (Room air) -- AG    10/17/24 0913 -- -- -- -- -- -- None (Room air) -- AG    10/17/24 0909 -- 90 15 118/9 96 % 206 lb (93.4 kg) None (Room air) -- AG          CIWA Scores (since admission)       Date/Time CIWA-Ar Total Who    10/18/24 1400 2 JL    10/18/24 1200 2 JL    10/18/24 1000 3 JL    10/18/24 0900 7 JL    10/18/24 0600 6 JH    10/18/24 0400 0 JH    10/18/24 0200 0 JH    10/18/24 0000 0 JH    10/17/24 2200 0 JH    10/17/24 2100  11 JH    10/17/24 2006 12 JH    10/17/24 1828 12 RS    10/17/24 1558 12 RS    10/17/24 1343 19 RS    10/17/24 1227 6 KS    10/17/24 1138 13 AG

## 2024-10-18 NOTE — CONSULTS
[facility]  part of [facility]    Report of Consultation    Srinath Tamayo Patient Status:  Inpatient    4/10/1985 MRN [id number]   Location [facility] 3W/SW Attending Christophe Wood MD   Hosp Day # 1 PCP No primary care provider on file.     Date of Admission:  10/17/2024  Date of Consult:  10/18/2024   Reason for Consultation:   Patient presented with alcohol withdrawal, Christophe Headley MD requested psychiatric consult for evaluation and advice.    Consult Duration     The patient seen for initial psychiatric consult evaluation.   Record reviewed, communication with attending, communication with RN and patient seen face to face evaluation.    History of Present Illness:   Patient is a 39 year old  single male lives with his mom with history of high, alcoholism and mood disorder who has presented to the hospital for chest pain.  Patient has been demonstrating withdrawal symptoms and anxiety, psych consult requested for evaluation and advice.    Reviewing record indicated that the patient came to the hospital with a chest pain.  No alcohol level has been reported.  Some elevation in liver enzyme with bilirubin is 1.7 and magnesium 2.2.    Evaluating the patient today in his room.  Patient presented calm and cooperative and attentive.  Patient did not demonstrate any cognitive impairment.    The patient reporting that he has a chronic history of alcohol abuse and he has been drinking for over 20 years.  Patient reporting that he is drinking for the last 5 to 6 years is almost 1 gallon of vodka on daily basis.  Patient reported that he does not know what brings him back to the alcohol because he knows it is an health phase and has been acting his mood and his health.    The patient indicated that he does seem to have mild withdrawal symptoms if he stops that shake, abdominal pain and nausea but never had any history of seizure or DTs.      The patient the meantime reported  that he has been in multiple rehab program, at least 7.  Patient reporting that he continue feeling well relapsing into excessive alcohol.    The patient reporting that he started seeing psychiatrist few years ago for having panic attacks with the psychiatrist gave him Klonopin 2 mg 3 times daily.  Patient reporting that he has not been seeing any psychiatrist currently.    The patient indicated that he tends to have intense panic attack with excessive anxiety, racing thoughts, chest tightness, palpitation and cold sweats.  Patient reporting the panic attack is very unpredictable and scary.    The patient also admitted that he has been demonstrating intense emotional alternation and find his mood has been changing rapidly.    Patient reporting episode of depression alternated with episode of irritability, excitability, racing thoughts, difficulty sleeping and excessive drinking.    Patient reporting that he did not remember sleeping in the last 10 days with increased racing thoughts and difficulty managing his emotion.    Patient denied any suicidal homicidal ideation.  Patient denies any auditory or visual hallucination.  Patient denied history of inpatient treatment.    Past Psychiatric/Medication History:  1. Prior diagnoses: Anxiety, alcoholism and mood disorder  2. Past psychiatric inpatient: None  3. Past outpatient history: Patient admitted seeing psychiatrist outpatient and admitted at the next 7 program for rehab  4. Past suicide history: None  5. Medication history: None currently    Social History:   Patient is single male lives with his mom who works on daily basis.  Patient reported that he smokes 1 pack/day but he quit yesterday when he came to the hospital?  Patient drink 1 gallon of vodka on daily basis for many years.  Patient denied substance abuse.    Family History:  Patient denied any knowledge of psychiatric family history  Medical History:   Past Medical History  Past Medical History:    ETOH  abuse    MI (myocardial infarction) (HCC)       Past Surgical History  History reviewed. No pertinent surgical history.    Family History  Family History   Problem Relation Age of Onset    Psychiatric Father         alcoholic    Other (Other) Father         dementia       Social History  Social History     Socioeconomic History    Marital status: Single   Tobacco Use    Smoking status: Every Day     Current packs/day: 1.00     Average packs/day: 1 pack/day for 20.8 years (20.8 ttl pk-yrs)     Types: Cigarettes     Start date: 2004    Smokeless tobacco: Never   Vaping Use    Vaping status: Never Used   Substance and Sexual Activity    Alcohol use: Yes     Comment: 2 pints of vodka per day. last drink 9am 10/17    Drug use: Never     Social Drivers of Health     Food Insecurity: Food Insecurity Present (10/17/2024)    Food Insecurity     Food Insecurity: Often true   Transportation Needs: Unmet Transportation Needs (10/17/2024)    Transportation Needs     Lack of Transportation: Yes   Housing Stability: Medium Risk (10/17/2024)    Housing Stability     Housing Instability: Yes     Housing Instability Emergency: No           Current Medications:  Current Facility-Administered Medications   Medication Dose Route Frequency    pantoprazole (Protonix) DR tab 40 mg  40 mg Oral QAM AC    gabapentin (Neurontin) cap 100 mg  100 mg Oral TID    QUEtiapine (SEROquel) tab 50 mg  50 mg Oral Nightly    ARIPiprazole (Abilify) tab 2 mg  2 mg Oral Daily    enoxaparin (Lovenox) 40 MG/0.4ML SUBQ injection 40 mg  40 mg Subcutaneous Daily    acetaminophen (Tylenol Extra Strength) tab 500 mg  500 mg Oral Q4H PRN    prochlorperazine (Compazine) 10 MG/2ML injection 5 mg  5 mg Intravenous Q8H PRN    LORazepam (Ativan) tab 1 mg  1 mg Oral Q1H PRN    Or    LORazepam (Ativan) tab 2 mg  2 mg Oral Q1H PRN    metoprolol tartrate (Lopressor) tab 25 mg  25 mg Oral BID PRN    thiamine (Vitamin B1) tab 100 mg  100 mg Oral Daily    multivitamin  (Tab-A-Debbie/Beta Carotene) tab 1 tablet  1 tablet Oral Daily    folic acid (Folvite) tab 1 mg  1 mg Oral Daily    influenza virus trivalent PF (Fluzone Trivalent) 0.5 mL IM injection (ages 6 months to 64 years) 0.5 mL  0.5 mL Intramuscular Prior to discharge    propranolol (Inderal) tab 10 mg  10 mg Oral BID    LORazepam (Ativan) tab 1 mg  1 mg Oral Q1H PRN    Or    LORazepam (Ativan) 2 mg/mL injection 1 mg  1 mg Intravenous Q1H PRN    LORazepam (Ativan) tab 2 mg  2 mg Oral Q1H PRN    Or    LORazepam (Ativan) 2 mg/mL injection 2 mg  2 mg Intravenous Q1H PRN    LORazepam (Ativan) tab 3 mg  3 mg Oral Q1H PRN    Or    LORazepam (Ativan) 2 mg/mL injection 3 mg  3 mg Intravenous Q1H PRN    LORazepam (Ativan) 2 mg/mL injection 4 mg  4 mg Intravenous Q30 Min PRN    LORazepam (Ativan) 2 mg/mL injection 5 mg  5 mg Intravenous Q15 Min PRN    LORazepam (Ativan) 2 mg/mL injection 6 mg  6 mg Intravenous Q10 Min PRN     Medications Prior to Admission   Medication Sig    propranolol 10 MG Oral Tab Take 1 tablet (10 mg total) by mouth 2 (two) times daily.       Allergies  Allergies[1]    Review of Systems:   As by Admitting/Attending    Results:   Laboratory Data:  Lab Results   Component Value Date    WBC 11.4 (H) 10/18/2024    HGB 16.1 10/18/2024    HCT 46.3 10/18/2024    .0 10/18/2024    CREATSERUM 1.08 10/18/2024    BUN 19 10/18/2024     10/18/2024    K 3.9 10/18/2024     10/18/2024    CO2 27.0 10/18/2024     (H) 10/18/2024    CA 9.5 10/18/2024    ALB 4.7 10/18/2024    ALKPHO 103 10/18/2024    TP 7.4 10/18/2024    AST 69 (H) 10/18/2024    ALT 76 (H) 10/18/2024    LIP 50 10/17/2024    MG 2.2 10/17/2024         Imaging:  XR CHEST AP PORTABLE  (CPT=71045)    Result Date: 10/17/2024  CONCLUSION: No radiographic evidence of acute cardiopulmonary abnormality.    Dictated by (CST): Lj Graf MD on 10/17/2024 at 10:25 AM     Finalized by (CST): Lj Graf MD on 10/17/2024 at 10:26 AM           Vital  Signs:   Blood pressure (!) 146/101, pulse 75, temperature 97.1 °F (36.2 °C), temperature source Oral, resp. rate 20, height 67\", weight 86.4 kg (190 lb 6.4 oz), SpO2 94%.    Mental Status Exam:   Appearance: Stated age male, in hospital gown, laying down in hospital bed.  Psychomotor: No psychomotor agitation, or retardation.  No tremors observed  Orientation: Alert and oriented to person, place, time and condition.  Gait: Intact  Attitude/Coorperation: Cooperative and attentive.  Behavior: Appropriate.  Speech: Regular rate and rhythm speech.  Mood: Patient reporting depressed mood.  Affect: Irritable affect congruent with the mood.  Thought process: Linear and appropriate.  Thought content: Patient denies any suicidal or homicidal ideation.  Perceptions: Patient denies any auditory or visual hallucinations.  Concentration: Grossly intact.  Memory: Grossly intact.  Intellect: Average.  Judgment and Insight: Questionable.  Patient has failed continued sobriety and sustained mood is stable.      Impression:     Alcohol withdrawal syndrome, uncomplicated.  Alcohol dependence, continuous.  Moderate bipolar 1 disorder most recent episode depression.  Panic disorder  Chest pain of uncertain etiology        The patient is a 39-year-old  single male lives with his mom with history of alcoholism, mood disorder and recent heart attack 6 months ago who presented to the hospital with chest pain.  Patient admitted significant history of alcohol dependency on daily basis and drinks close to 1 gallon of vodka.  Patient denies any history of seizure or DTs.    Also patient reporting history of depression with recent episode otherwise alternated with mood swings, difficulty sleeping, racing thoughts, irritability and distractibility.    Discussed risk and benefit, acknowledging the current symptom and severity.  At this point, I would recommend the following approach:     Focus on safety  Focus on education and  support.  Focus on insight orientation helping the patient understand diagnosis and treatment plan.  Continue CIWA protocol.  Start Abilify 2 mg daily.  Start gabapentin 100 mg 3 times daily.  Start Seroquel 50 mg nightly.  Continue thiamine 100 mg daily  Processed with patient at length, the initiation of the above psychotropic medications I advised the patient of the risks, benefits, alternatives and potential side effects. The patient consents to administration of the medications and understands the right to refuse medications at any time. The patient verbalized understanding.   Coordinate plan with team    Orders This Visit:  Orders Placed This Encounter   Procedures    CBC With Differential With Platelet    Hepatic Function Panel (7)    Basic Metabolic Panel (8)    Lipase    Troponin I (High Sensitivity)    Troponin I (High Sensitivity)    Magnesium    Comp Metabolic Panel (14)    CBC With Differential With Platelet    Comp Metabolic Panel (14)    Clostridium difficile(toxigenic)PCR       Meds This Visit:  Requested Prescriptions      No prescriptions requested or ordered in this encounter       Otoniel Christensen MD  10/18/2024    Note to Patient: The 21st Century Cures Act makes medical notes like these available to patients in the interest of transparency. However, be advised this is a medical document. It is intended as peer to peer communication. It is written in medical language and may contain abbreviations or verbiage that are unfamiliar. It may appear blunt or direct. Medical documents are intended to carry relevant information, facts as evident, and the clinical opinion of the practitioner. This note may have been transcribed using a voice dictation system. Voice recognition errors may occur. This should not be taken to alter the content or meaning of this note.          [1] No Known Allergies

## 2024-10-19 LAB
ALBUMIN SERPL-MCNC: 4.6 G/DL (ref 3.2–4.8)
ALBUMIN/GLOB SERPL: 1.6 {RATIO} (ref 1–2)
ALP LIVER SERPL-CCNC: 98 U/L
ALT SERPL-CCNC: 61 U/L
ANION GAP SERPL CALC-SCNC: 10 MMOL/L (ref 0–18)
AST SERPL-CCNC: 47 U/L (ref ?–34)
BILIRUB SERPL-MCNC: 1.4 MG/DL (ref 0.3–1.2)
BUN BLD-MCNC: 17 MG/DL (ref 9–23)
BUN/CREAT SERPL: 16.3 (ref 10–20)
CALCIUM BLD-MCNC: 9.6 MG/DL (ref 8.7–10.4)
CHLORIDE SERPL-SCNC: 105 MMOL/L (ref 98–112)
CO2 SERPL-SCNC: 28 MMOL/L (ref 21–32)
CREAT BLD-MCNC: 1.04 MG/DL
EGFRCR SERPLBLD CKD-EPI 2021: 93 ML/MIN/1.73M2 (ref 60–?)
GLOBULIN PLAS-MCNC: 2.8 G/DL (ref 2–3.5)
GLUCOSE BLD-MCNC: 110 MG/DL (ref 70–99)
OSMOLALITY SERPL CALC.SUM OF ELEC: 298 MOSM/KG (ref 275–295)
POTASSIUM SERPL-SCNC: 3.9 MMOL/L (ref 3.5–5.1)
PROT SERPL-MCNC: 7.4 G/DL (ref 5.7–8.2)
SODIUM SERPL-SCNC: 143 MMOL/L (ref 136–145)

## 2024-10-19 PROCEDURE — 99233 SBSQ HOSP IP/OBS HIGH 50: CPT | Performed by: OTHER

## 2024-10-19 PROCEDURE — 99233 SBSQ HOSP IP/OBS HIGH 50: CPT | Performed by: HOSPITALIST

## 2024-10-19 RX ORDER — QUETIAPINE FUMARATE 25 MG/1
100 TABLET, FILM COATED ORAL NIGHTLY
Status: DISCONTINUED | OUTPATIENT
Start: 2024-10-19 | End: 2024-10-21

## 2024-10-19 RX ORDER — DIPHENHYDRAMINE HCL 25 MG
25 CAPSULE ORAL NIGHTLY PRN
Status: DISCONTINUED | OUTPATIENT
Start: 2024-10-19 | End: 2024-10-21

## 2024-10-19 RX ORDER — ARIPIPRAZOLE 5 MG/1
5 TABLET ORAL DAILY
Status: DISCONTINUED | OUTPATIENT
Start: 2024-10-20 | End: 2024-10-21

## 2024-10-19 RX ORDER — LORAZEPAM 0.5 MG/1
1 TABLET ORAL EVERY 6 HOURS PRN
Status: DISCONTINUED | OUTPATIENT
Start: 2024-10-19 | End: 2024-10-21

## 2024-10-19 NOTE — PLAN OF CARE
Patient alert and oriented x 4. Vitals stable on room air. Patient denies pain. Plan to monitor over weekend and discharge on Monday if medically cleared. Call light within reach.    Problem: Patient Centered Care  Goal: Patient preferences are identified and integrated in the patient's plan of care  Description: Interventions:  - What would you like us to know as we care for you? From home with mother  - Provide timely, complete, and accurate information to patient/family  - Incorporate patient and family knowledge, values, beliefs, and cultural backgrounds into the planning and delivery of care  - Encourage patient/family to participate in care and decision-making at the level they choose  - Honor patient and family perspectives and choices  10/19/2024 1711 by Arron Lebron, RN  Outcome: Progressing  10/19/2024 1710 by Arron Lebron RN  Outcome: Progressing     Problem: Patient/Family Goals  Goal: Patient/Family Long Term Goal  Description: Patient's Long Term Goal: Be able to discharge    Interventions:  - Medication administration  - See additional Care Plan goals for specific interventions  10/19/2024 1711 by Arron Lebron RN  Outcome: Progressing  10/19/2024 1710 by Arron Lebron RN  Outcome: Progressing  Goal: Patient/Family Short Term Goal  Description: Patient's Short Term Goal: Get some sleep    Interventions:   - PO benadryl  - PO seroquel  - See additional Care Plan goals for specific interventions  10/19/2024 1711 by Arron Lebron, RN  Outcome: Progressing  10/19/2024 1710 by Arron Lebron RN  Outcome: Progressing     Problem: SAFETY ADULT - FALL  Goal: Free from fall injury  Description: INTERVENTIONS:  - Assess pt frequently for physical needs  - Identify cognitive and physical deficits and behaviors that affect risk of falls.  - [place] fall precautions as indicated by assessment.  - Educate pt/family on patient safety including physical limitations  - Instruct pt to call for assistance with activity based  on assessment  - Modify environment to reduce risk of injury  - Provide assistive devices as appropriate  - Consider OT/PT consult to assist with strengthening/mobility  - Encourage toileting schedule  10/19/2024 1711 by Arron Lebron RN  Outcome: Progressing  10/19/2024 1710 by Arron Lebron RN  Outcome: Progressing     Problem: GASTROINTESTINAL - ADULT  Goal: Minimal or absence of nausea and vomiting  Description: INTERVENTIONS:  - Maintain adequate hydration with IV or PO as ordered and tolerated  - Nasogastric tube to low intermittent suction as ordered  - Evaluate effectiveness of ordered antiemetic medications  - Provide nonpharmacologic comfort measures as appropriate  - Advance diet as tolerated, if ordered  - Obtain nutritional consult as needed  - Evaluate fluid balance  10/19/2024 1711 by Arron Lebron RN  Outcome: Progressing  10/19/2024 1710 by Arron Lebron RN  Outcome: Progressing  Goal: Maintains adequate nutritional intake (undernourished)  Description: INTERVENTIONS:  - Monitor percentage of each meal consumed  - Identify factors contributing to decreased intake, treat as appropriate  - Assist with meals as needed  - Monitor I&O, WT and lab values  - Obtain nutritional consult as needed  - Optimize oral hygiene and moisture  - Encourage food from home; allow for food preferences  - Enhance eating environment  10/19/2024 1711 by Arron Lebron RN  Outcome: Progressing  10/19/2024 1710 by Arron Lebron RN  Outcome: Progressing     Problem: METABOLIC/FLUID AND ELECTROLYTES - ADULT  Goal: Electrolytes maintained within normal limits  Description: INTERVENTIONS:  - Monitor labs and rhythm and assess patient for signs and symptoms of electrolyte imbalances  - Administer electrolyte replacement as ordered  - Monitor response to electrolyte replacements, including rhythm and repeat lab results as appropriate  - Fluid restriction as ordered  - Instruct patient on fluid and nutrition restrictions as  appropriate  10/19/2024 1711 by Arron Lebron, RN  Outcome: Progressing  10/19/2024 1710 by Arron Lebron, RN  Outcome: Progressing

## 2024-10-19 NOTE — PROGRESS NOTES
[facility]  part of Merg[facility] with [facility]  Hospitalist Progress Note     Srinath Tamayo Patient Status:  Observation    4/10/1985  39 year old CSN [id number]   Location 308/308-A Attending Christophe Wood MD   Hosp Day # 2 PCP No primary care provider on file.     Assessment & Plan:   ----------------------------------  Acute alcohol withdrawal.  Symptoms are mild.  Tremor is not present.  Delirium not present.  History of alcohol withdrawal seizures 20yrs ago present. Doing well overall.  -Detox protocol  -Psychiatry consultation  -Telemetry  -Restraints if needed  -Social work for resources  -Thiamine replacement    Alcohol abuse.  Currently with mild signs or symptoms of withdrawal  -Monitor for tremor, tachycardia, psychosis, anxiety  -Alcohol cessation encouraged  -Social work for resources  -Psychiatry consult    Alcoholic gastritis.  Mild.  Nausea but no vomiting.  No hematemesis or melena.  -Protonix  -Diet as tolerated    Abnormal LFTs.  Alcohol related.  Bilirubin and transaminases slightly elevated but overall mild.  -Recheck LFTs in the morning, still pending    Chest pain.  Initially complained about chest pain in the ER though patient admits that this was not the case.  He also stated that he had stents placed but this was also not true, patient says that he was anxious and started making things up. No recurrence  -expectant management    DVT Mechanical Prophylaxis:        DVT Pharmacologic Prophylaxis   Medication    enoxaparin (Lovenox) 40 MG/0.4ML SUBQ injection 40 mg              code status: full   dispo: home upon medical clearance  If applicable, malnutrition status at bottom of note    I personally reviewed the available laboratories, imaging including. I discussed/will discuss the case with consultants. I ordered laboratories and/or radiographic studies. I adjusted medications as detailed above.  Medical decision making high, risk is high.    Subjective:    ----------------------------------  Feeling better overall.  Minimal abdominal pain, improved from yesterday again.  Not much appetite.  No tremor.  No hallucinations or delusions.      Objective:   Chief Complaint:   Chief Complaint   Patient presents with    Abdomen/Flank Pain    Chest Pain     ----------------------------------  Temp:  [97.1 °F (36.2 °C)-98.3 °F (36.8 °C)] 98.2 °F (36.8 °C)  Pulse:  [] 68  Resp:  [20] 20  BP: (115-152)/() 136/101  SpO2:  [94 %-97 %] 96 %  Gen: A+Ox3.  No distress.   HEENT: NCAT, neck supple, no carotid bruit.  CV: RRR, S1S2, and intact distal pulses. No gallop, rub, murmur.  Pulm: Effort and breath sounds normal. No distress, wheezes, rales, rhonchi.  Abd: Soft, minimal epigastric tenderness to palpation without rebound or guarding.  Neuro: Normal reflexes, CN. Sensory/motor exams grossly normal deficit.   MS: No joint effusions.  No peripheral edema.  Skin: Skin is warm and dry. No rashes, erythema, diaphoresis.   Psych: Normal mood and affect. Calm, cooperative    Labs:  Lab Results   Component Value Date    HGB 16.1 10/18/2024    WBC 11.4 (H) 10/18/2024    .0 10/18/2024     10/18/2024    K 3.9 10/18/2024    CREATSERUM 1.08 10/18/2024    AST 69 (H) 10/18/2024    ALT 76 (H) 10/18/2024            pantoprazole  40 mg Oral QAM AC    gabapentin  100 mg Oral TID    QUEtiapine  50 mg Oral Nightly    ARIPiprazole  2 mg Oral Daily    enoxaparin  40 mg Subcutaneous Daily    thiamine  100 mg Oral Daily    multivitamin  1 tablet Oral Daily    folic acid  1 mg Oral Daily    propranolol  10 mg Oral BID       acetaminophen    prochlorperazine    LORazepam **OR** LORazepam    metoprolol tartrate    influenza virus vaccine PF    LORazepam **OR** LORazepam    LORazepam **OR** LORazepam    LORazepam **OR** LORazepam    LORazepam    LORazepam    LORazepam

## 2024-10-19 NOTE — PLAN OF CARE
Problem: Patient Centered Care  Goal: Patient preferences are identified and integrated in the patient's plan of care  Description: Interventions:  - What would you like us to know as we care for you? From home with mum  - Provide timely, complete, and accurate information to patient/family  - Incorporate patient and family knowledge, values, beliefs, and cultural backgrounds into the planning and delivery of care  - Encourage patient/family to participate in care and decision-making at the level they choose  - Honor patient and family perspectives and choices  Outcome: Progressing     Problem: Patient/Family Goals  Goal: Patient/Family Long Term Goal  Description: Patient's Long Term Goal: Go home    Interventions:  - See MD plan of care  - See additional Care Plan goals for specific interventions  Outcome: Progressing  Goal: Patient/Family Short Term Goal  Description: Patient's Short Term Goal: Feel Better    Interventions:   - See MD plan  of care  - See additional Care Plan goals for specific interventions  Outcome: Progressing     Problem: SAFETY ADULT - FALL  Goal: Free from fall injury  Description: INTERVENTIONS:  - Assess pt frequently for physical needs  - Identify cognitive and physical deficits and behaviors that affect risk of falls.  - [place] fall precautions as indicated by assessment.  - Educate pt/family on patient safety including physical limitations  - Instruct pt to call for assistance with activity based on assessment  - Modify environment to reduce risk of injury  - Provide assistive devices as appropriate  - Consider OT/PT consult to assist with strengthening/mobility  - Encourage toileting schedule  Outcome: Progressing     Problem: GASTROINTESTINAL - ADULT  Goal: Minimal or absence of nausea and vomiting  Description: INTERVENTIONS:  - Maintain adequate hydration with IV or PO as ordered and tolerated  - Nasogastric tube to low intermittent suction as ordered  - Evaluate effectiveness  of ordered antiemetic medications  - Provide nonpharmacologic comfort measures as appropriate  - Advance diet as tolerated, if ordered  - Obtain nutritional consult as needed  - Evaluate fluid balance  Outcome: Progressing  Goal: Maintains adequate nutritional intake (undernourished)  Description: INTERVENTIONS:  - Monitor percentage of each meal consumed  - Identify factors contributing to decreased intake, treat as appropriate  - Assist with meals as needed  - Monitor I&O, WT and lab values  - Obtain nutritional consult as needed  - Optimize oral hygiene and moisture  - Encourage food from home; allow for food preferences  - Enhance eating environment  Outcome: Progressing     Problem: METABOLIC/FLUID AND ELECTROLYTES - ADULT  Goal: Electrolytes maintained within normal limits  Description: INTERVENTIONS:  - Monitor labs and rhythm and assess patient for signs and symptoms of electrolyte imbalances  - Administer electrolyte replacement as ordered  - Monitor response to electrolyte replacements, including rhythm and repeat lab results as appropriate  - Fluid restriction as ordered  - Instruct patient on fluid and nutrition restrictions as appropriate  Outcome: Progressing

## 2024-10-20 LAB
ALBUMIN SERPL-MCNC: 4.3 G/DL (ref 3.2–4.8)
ALP LIVER SERPL-CCNC: 87 U/L
ALT SERPL-CCNC: 49 U/L
AST SERPL-CCNC: 32 U/L (ref ?–34)
BILIRUB DIRECT SERPL-MCNC: 0.3 MG/DL (ref ?–0.3)
BILIRUB SERPL-MCNC: 1.1 MG/DL (ref 0.3–1.2)
PROT SERPL-MCNC: 6.9 G/DL (ref 5.7–8.2)

## 2024-10-20 PROCEDURE — 99233 SBSQ HOSP IP/OBS HIGH 50: CPT | Performed by: HOSPITALIST

## 2024-10-20 PROCEDURE — 99233 SBSQ HOSP IP/OBS HIGH 50: CPT | Performed by: OTHER

## 2024-10-20 RX ORDER — GABAPENTIN 100 MG/1
100 CAPSULE ORAL 3 TIMES DAILY
Qty: 90 CAPSULE | Refills: 1 | Status: SHIPPED | OUTPATIENT
Start: 2024-10-20

## 2024-10-20 RX ORDER — ARIPIPRAZOLE 5 MG/1
5 TABLET ORAL DAILY
Qty: 30 TABLET | Refills: 1 | Status: SHIPPED | OUTPATIENT
Start: 2024-10-21

## 2024-10-20 RX ORDER — HYDROXYZINE HYDROCHLORIDE 25 MG/1
25 TABLET, FILM COATED ORAL DAILY PRN
Status: DISCONTINUED | OUTPATIENT
Start: 2024-10-20 | End: 2024-10-21

## 2024-10-20 RX ORDER — HYDROXYZINE HYDROCHLORIDE 25 MG/1
50 TABLET, FILM COATED ORAL NIGHTLY
Status: DISCONTINUED | OUTPATIENT
Start: 2024-10-20 | End: 2024-10-21

## 2024-10-20 RX ORDER — HYDROXYZINE HYDROCHLORIDE 25 MG/1
25 TABLET, FILM COATED ORAL DAILY PRN
Qty: 30 TABLET | Refills: 1 | Status: SHIPPED | OUTPATIENT
Start: 2024-10-20

## 2024-10-20 RX ORDER — HYDROXYZINE HYDROCHLORIDE 50 MG/1
50 TABLET, FILM COATED ORAL NIGHTLY
Qty: 30 TABLET | Refills: 1 | Status: SHIPPED | OUTPATIENT
Start: 2024-10-20

## 2024-10-20 RX ORDER — QUETIAPINE FUMARATE 100 MG/1
100 TABLET, FILM COATED ORAL NIGHTLY
Qty: 30 TABLET | Refills: 1 | Status: SHIPPED | OUTPATIENT
Start: 2024-10-20

## 2024-10-20 NOTE — PLAN OF CARE
Plan for home 10/21, cont to monitor BP and anxiety. Medication adjustments per Pysch.      Problem: Patient Centered Care  Goal: Patient preferences are identified and integrated in the patient's plan of care  Description: Interventions:  - What would you like us to know as we care for you? Home with mom  - Provide timely, complete, and accurate information to patient/family  - Incorporate patient and family knowledge, values, beliefs, and cultural backgrounds into the planning and delivery of care  - Encourage patient/family to participate in care and decision-making at the level they choose  - Honor patient and family perspectives and choices  Outcome: Progressing     Problem: Patient/Family Goals  Goal: Patient/Family Long Term Goal  Description: Patient's Long Term Goal:     Interventions:  -   - See additional Care Plan goals for specific interventions  Outcome: Progressing  Goal: Patient/Family Short Term Goal  Description: Patient's Short Term Goal:     Interventions:   -  - See additional Care Plan goals for specific interventions  Outcome: Progressing     Problem: SAFETY ADULT - FALL  Goal: Free from fall injury  Description: INTERVENTIONS:  - Assess pt frequently for physical needs  - Identify cognitive and physical deficits and behaviors that affect risk of falls.  - [place] fall precautions as indicated by assessment.  - Educate pt/family on patient safety including physical limitations  - Instruct pt to call for assistance with activity based on assessment  - Modify environment to reduce risk of injury  - Provide assistive devices as appropriate  - Consider OT/PT consult to assist with strengthening/mobility  - Encourage toileting schedule  Outcome: Progressing     Problem: GASTROINTESTINAL - ADULT  Goal: Minimal or absence of nausea and vomiting  Description: INTERVENTIONS:  - Maintain adequate hydration with IV or PO as ordered and tolerated  - Nasogastric tube to low intermittent suction as  ordered  - Evaluate effectiveness of ordered antiemetic medications  - Provide nonpharmacologic comfort measures as appropriate  - Advance diet as tolerated, if ordered  - Obtain nutritional consult as needed  - Evaluate fluid balance  Outcome: Progressing  Goal: Maintains adequate nutritional intake (undernourished)  Description: INTERVENTIONS:  - Monitor percentage of each meal consumed  - Identify factors contributing to decreased intake, treat as appropriate  - Assist with meals as needed  - Monitor I&O, WT and lab values  - Obtain nutritional consult as needed  - Optimize oral hygiene and moisture  - Encourage food from home; allow for food preferences  - Enhance eating environment  Outcome: Progressing     Problem: METABOLIC/FLUID AND ELECTROLYTES - ADULT  Goal: Electrolytes maintained within normal limits  Description: INTERVENTIONS:  - Monitor labs and rhythm and assess patient for signs and symptoms of electrolyte imbalances  - Administer electrolyte replacement as ordered  - Monitor response to electrolyte replacements, including rhythm and repeat lab results as appropriate  - Fluid restriction as ordered  - Instruct patient on fluid and nutrition restrictions as appropriate  Outcome: Progressing

## 2024-10-20 NOTE — PROGRESS NOTES
Patient is a 39 year old  single male lives with his mom with history of high, alcoholism and mood disorder who has presented to the hospital for chest pain.  Patient has been demonstrating withdrawal symptoms and anxiety, psych consult requested for evaluation and advice.    Consult Duration     The patient seen for over 50 minutes, further evaluation, over 50% counseling and coordinating care addressing alcohol withdrawal, mood alternation and anxiety.    Record reviewed, communication with attending, communication with RN and patient seen face to face evaluation.    History of Present Illness:     According to the team the patient has not been demonstrating high CIWA but his anxiety was high this morning he indicated 1 mg of Ativan.    The patient laying down in his bed presented very welcoming and appropriate demonstrating no cognitive impairment.    Patient was thankful for the help and reporting generally speaking he feels much better and he is able to hold food in.    The patient reported that he did not sleep well last night and he was anxious with racing thoughts and he had panic attack this morning.    Patient reporting that he continue struggling with with his alcoholism and continue puzzled about the motive of his recurrent relapse although he knows clearly how harmful substances to his mood and behavior.    Patient reporting that his mom has been fired from seeing him getting through these episodes and she want him to go into treatment.    Patient reported that he has arranged with a friend traveling to [place] on Monday to start a new program.      Patient continue reporting episode of depression alternated with episode of irritability, excitability, racing thoughts, difficulty sleeping and excessive drinking.    Patient denied any suicidal homicidal ideation.  Patient denies any auditory or visual hallucination.  Patient denied any side effect to the medication.  Patient denied history  of inpatient treatment.    Past Psychiatric/Medication History:  1. Prior diagnoses: Anxiety, alcoholism and mood disorder  2. Past psychiatric inpatient: None  3. Past outpatient history: Patient admitted seeing psychiatrist outpatient and admitted at the next  program for rehab  4. Past suicide history: None  5. Medication history: None currently    Social History:   Patient is single male lives with his mom who works on daily basis.  Patient reported that he smokes 1 pack/day but he quit yesterday when he came to the hospital?  Patient drink 1 gallon of vodka on daily basis for many years.  Patient denied substance abuse.    Family History:  Patient denied any knowledge of psychiatric family history  Medical History:   Past Medical History  Past Medical History:    ETOH abuse    MI (myocardial infarction) (HCC)       Past Surgical History  History reviewed. No pertinent surgical history.    Family History  Family History   Problem Relation Age of Onset    Psychiatric Father         alcoholic    Other (Other) Father         dementia       Social History  Social History     Socioeconomic History    Marital status: Single   Tobacco Use    Smoking status: Every Day     Current packs/day: 1.00     Average packs/day: 1 pack/day for 20.8 years (20.8 ttl pk-yrs)     Types: Cigarettes     Start date: 2004    Smokeless tobacco: Never   Vaping Use    Vaping status: Never Used   Substance and Sexual Activity    Alcohol use: Yes     Comment: 2 pints of vodka per day. last drink 9am 10/17    Drug use: Never     Social Drivers of Health     Food Insecurity: Food Insecurity Present (10/17/2024)    Food Insecurity     Food Insecurity: Often true   Transportation Needs: Unmet Transportation Needs (10/17/2024)    Transportation Needs     Lack of Transportation: Yes   Housing Stability: Medium Risk (10/17/2024)    Housing Stability     Housing Instability: Yes     Housing Instability Emergency: No           Current  Medications:  Current Facility-Administered Medications   Medication Dose Route Frequency    diphenhydrAMINE (Benadryl) cap/tab 25 mg  25 mg Oral Nightly PRN    QUEtiapine (SEROquel) tab 100 mg  100 mg Oral Nightly    LORazepam (Ativan) tab 1 mg  1 mg Oral Q6H PRN    pantoprazole (Protonix) DR tab 40 mg  40 mg Oral QAM AC    gabapentin (Neurontin) cap 100 mg  100 mg Oral TID    ARIPiprazole (Abilify) tab 2 mg  2 mg Oral Daily    enoxaparin (Lovenox) 40 MG/0.4ML SUBQ injection 40 mg  40 mg Subcutaneous Daily    acetaminophen (Tylenol Extra Strength) tab 500 mg  500 mg Oral Q4H PRN    prochlorperazine (Compazine) 10 MG/2ML injection 5 mg  5 mg Intravenous Q8H PRN    LORazepam (Ativan) tab 1 mg  1 mg Oral Q1H PRN    Or    LORazepam (Ativan) tab 2 mg  2 mg Oral Q1H PRN    metoprolol tartrate (Lopressor) tab 25 mg  25 mg Oral BID PRN    thiamine (Vitamin B1) tab 100 mg  100 mg Oral Daily    multivitamin (Tab-A-Debbie/Beta Carotene) tab 1 tablet  1 tablet Oral Daily    folic acid (Folvite) tab 1 mg  1 mg Oral Daily    influenza virus trivalent PF (Fluzone Trivalent) 0.5 mL IM injection (ages 6 months to 64 years) 0.5 mL  0.5 mL Intramuscular Prior to discharge    propranolol (Inderal) tab 10 mg  10 mg Oral BID    LORazepam (Ativan) tab 1 mg  1 mg Oral Q1H PRN    Or    LORazepam (Ativan) 2 mg/mL injection 1 mg  1 mg Intravenous Q1H PRN    LORazepam (Ativan) tab 2 mg  2 mg Oral Q1H PRN    Or    LORazepam (Ativan) 2 mg/mL injection 2 mg  2 mg Intravenous Q1H PRN    LORazepam (Ativan) tab 3 mg  3 mg Oral Q1H PRN    Or    LORazepam (Ativan) 2 mg/mL injection 3 mg  3 mg Intravenous Q1H PRN    LORazepam (Ativan) 2 mg/mL injection 4 mg  4 mg Intravenous Q30 Min PRN    LORazepam (Ativan) 2 mg/mL injection 5 mg  5 mg Intravenous Q15 Min PRN    LORazepam (Ativan) 2 mg/mL injection 6 mg  6 mg Intravenous Q10 Min PRN     Medications Prior to Admission   Medication Sig    propranolol 10 MG Oral Tab Take 1 tablet (10 mg total) by mouth 2  (two) times daily.       Allergies  Allergies[1]    Review of Systems:   As by Admitting/Attending    Results:   Laboratory Data:  Lab Results   Component Value Date    WBC 11.4 (H) 10/18/2024    HGB 16.1 10/18/2024    HCT 46.3 10/18/2024    .0 10/18/2024    CREATSERUM 1.04 10/19/2024    BUN 17 10/19/2024     10/19/2024    K 3.9 10/19/2024     10/19/2024    CO2 28.0 10/19/2024     (H) 10/19/2024    CA 9.6 10/19/2024    ALB 4.6 10/19/2024    ALKPHO 98 10/19/2024    TP 7.4 10/19/2024    AST 47 (H) 10/19/2024    ALT 61 (H) 10/19/2024    LIP 50 10/17/2024    MG 2.2 10/17/2024         Imaging:  XR CHEST AP PORTABLE  (CPT=71045)    Result Date: 10/17/2024  CONCLUSION: No radiographic evidence of acute cardiopulmonary abnormality.    Dictated by (CST): Lj Graf MD on 10/17/2024 at 10:25 AM     Finalized by (CST): Lj Grfa MD on 10/17/2024 at 10:26 AM           Vital Signs:   Blood pressure 140/87, pulse 62, temperature 98.5 °F (36.9 °C), temperature source Oral, resp. rate 20, height 67\", weight 84 kg (185 lb 3.2 oz), SpO2 96%.    Mental Status Exam:   Appearance: Stated age male, in hospital gown, laying down in hospital bed.  Psychomotor: No psychomotor agitation, or retardation.  No tremors observed  Orientation: Alert and oriented to person, place, time and condition.  Gait: Intact  Attitude/Coorperation: Cooperative and attentive.  Behavior: Appropriate.  Speech: Regular rate and rhythm speech.  Mood: Patient reporting depressed and anxious mood.  Affect: Anxious affect congruent with the mood.  Thought process: Linear and appropriate.  Thought content: Patient denies any suicidal or homicidal ideation.  Perceptions: Patient denies any auditory or visual hallucinations.  Concentration: Grossly intact.  Memory: Grossly intact.  Intellect: Average.  Judgment and Insight: Questionable.  Patient has failed continued sobriety and sustained mood is stable.      Impression:     Alcohol  withdrawal syndrome, uncomplicated.  Alcohol dependence, continuous.  Moderate bipolar 1 disorder most recent episode depression.  Panic disorder  Chest pain of uncertain etiology        The patient is a 39-year-old  single male lives with his mom with history of alcoholism, mood disorder and recent heart attack 6 months ago who presented to the hospital with chest pain.  Patient admitted significant history of alcohol dependency on daily basis and drinks close to 1 gallon of vodka.  Patient denies any history of seizure or DTs.    Also patient reporting history of depression with recent episode otherwise alternated with mood swings, difficulty sleeping, racing thoughts, irritability and distractibility.    10/19/2024: The patient has not been demonstrating symptom of withdrawal otherwise his emotion, anxiety and difficulty sleeping has been a factor need to be addressed.    Discussed risk and benefit, acknowledging the current symptom and severity.  At this point, I would recommend the following approach:     Focus on safety.  Okay for discharge on Monday morning  Focus on education and support.  Focus on insight orientation helping the patient understand diagnosis and treatment plan.  Discontinue CIWA protocol.  Increase Abilify to 5 mg daily.  Continue gabapentin 100 mg 3 times daily.  Increase Seroquel to 100 mg nightly.  Provide Ativan during this hospitalization, 1 mg every 6 hours as needed for anxiety.  Continue thiamine 100 mg daily  Processed with patient at length, the initiation of the above psychotropic medications I advised the patient of the risks, benefits, alternatives and potential side effects. The patient consents to administration of the medications and understands the right to refuse medications at any time. The patient verbalized understanding.   Coordinate plan with team    Orders This Visit:  Orders Placed This Encounter   Procedures    CBC With Differential With Platelet    Hepatic  Function Panel (7)    Basic Metabolic Panel (8)    Lipase    Troponin I (High Sensitivity)    Troponin I (High Sensitivity)    Magnesium    Comp Metabolic Panel (14)    CBC With Differential With Platelet    Comp Metabolic Panel (14)    Hepatic Function Panel (7)    Clostridium difficile(toxigenic)PCR       Meds This Visit:  Requested Prescriptions      No prescriptions requested or ordered in this encounter       Otoniel Christensen MD  10/19/2024    Note to Patient: The 21st Century Cures Act makes medical notes like these available to patients in the interest of transparency. However, be advised this is a medical document. It is intended as peer to peer communication. It is written in medical language and may contain abbreviations or verbiage that are unfamiliar. It may appear blunt or direct. Medical documents are intended to carry relevant information, facts as evident, and the clinical opinion of the practitioner. This note may have been transcribed using a voice dictation system. Voice recognition errors may occur. This should not be taken to alter the content or meaning of this note.            [1] No Known Allergies

## 2024-10-20 NOTE — PLAN OF CARE
Problem: Patient Centered Care  Goal: Patient preferences are identified and integrated in the patient's plan of care  Description: Interventions:  - What would you like us to know as we care for you? Lives with mum  - Provide timely, complete, and accurate information to patient/family  - Incorporate patient and family knowledge, values, beliefs, and cultural backgrounds into the planning and delivery of care  - Encourage patient/family to participate in care and decision-making at the level they choose  - Honor patient and family perspectives and choices  Outcome: Progressing     Problem: Patient/Family Goals  Goal: Patient/Family Long Term Goal  Description: Patient's Long Term Goal: go home    Interventions:  - see MD plan of care  - See additional Care Plan goals for specific interventions  Outcome: Progressing  Goal: Patient/Family Short Term Goal  Description: Patient's Short Term Goal: Feel Better    Interventions:   - See MD plan of care  - See additional Care Plan goals for specific interventions  Outcome: Progressing     Problem: SAFETY ADULT - FALL  Goal: Free from fall injury  Description: INTERVENTIONS:  - Assess pt frequently for physical needs  - Identify cognitive and physical deficits and behaviors that affect risk of falls.  - [place] fall precautions as indicated by assessment.  - Educate pt/family on patient safety including physical limitations  - Instruct pt to call for assistance with activity based on assessment  - Modify environment to reduce risk of injury  - Provide assistive devices as appropriate  - Consider OT/PT consult to assist with strengthening/mobility  - Encourage toileting schedule  Outcome: Progressing     Problem: GASTROINTESTINAL - ADULT  Goal: Minimal or absence of nausea and vomiting  Description: INTERVENTIONS:  - Maintain adequate hydration with IV or PO as ordered and tolerated  - Nasogastric tube to low intermittent suction as ordered  - Evaluate effectiveness of  ordered antiemetic medications  - Provide nonpharmacologic comfort measures as appropriate  - Advance diet as tolerated, if ordered  - Obtain nutritional consult as needed  - Evaluate fluid balance  Outcome: Progressing  Goal: Maintains adequate nutritional intake (undernourished)  Description: INTERVENTIONS:  - Monitor percentage of each meal consumed  - Identify factors contributing to decreased intake, treat as appropriate  - Assist with meals as needed  - Monitor I&O, WT and lab values  - Obtain nutritional consult as needed  - Optimize oral hygiene and moisture  - Encourage food from home; allow for food preferences  - Enhance eating environment  Outcome: Progressing     Problem: METABOLIC/FLUID AND ELECTROLYTES - ADULT  Goal: Electrolytes maintained within normal limits  Description: INTERVENTIONS:  - Monitor labs and rhythm and assess patient for signs and symptoms of electrolyte imbalances  - Administer electrolyte replacement as ordered  - Monitor response to electrolyte replacements, including rhythm and repeat lab results as appropriate  - Fluid restriction as ordered  - Instruct patient on fluid and nutrition restrictions as appropriate  Outcome: Progressing

## 2024-10-20 NOTE — PROGRESS NOTES
[facility]  part of [facility]  Hospitalist Progress Note     Srinath Tamayo Patient Status:  Observation    4/10/1985  39 year old CSN [id number]   Location 308/308-A Attending Christophe Wood MD   Hosp Day # 3 PCP No primary care provider on file.     Assessment & Plan:   ----------------------------------  Acute alcohol withdrawal.  Symptoms are mild.  Tremor is not present.  Delirium not present.  History of alcohol withdrawal seizures 20yrs ago present. Doing well overall.  -dc detox protocol per psych recs  -Psychiatry consultation dolores  -Telemetry  -Thiamine replacement    Alcohol abuse.  Currently with mild signs or symptoms of withdrawal  -Monitor for tremor, tachycardia, psychosis, anxiety  -Alcohol cessation encouraged  -Social work for resources  -Psychiatry consult    Alcoholic gastritis.  Mild.  Nausea but no vomiting.  No hematemesis or melena. Resolved by sxs  -Protonix  -Diet as tolerated    Abnormal LFTs.  Alcohol related.  Bilirubin and transaminases much better  -Recheck LFTs if clinical change    Chest pain.  Initially complained about chest pain in the ER though patient admits that this was not the case.  He also stated that he had stents placed but this was also not true, patient says that he was anxious and started making things up. No recurrence  -expectant management    DVT Mechanical Prophylaxis:        DVT Pharmacologic Prophylaxis   Medication    enoxaparin (Lovenox) 40 MG/0.4ML SUBQ injection 40 mg              code status: full   dispo: home tomorrow  If applicable, malnutrition status at bottom of note    I personally reviewed the available laboratories, imaging including. I discussed/will discuss the case with consultants. I ordered laboratories and/or radiographic studies. I adjusted medications as detailed above.  Medical decision making high, risk is high.    Subjective:   ----------------------------------  Feeling better overall.  Resolved abdominal pain,  improved from yesterday again.  Improved appetite.  No tremor.  No hallucinations or delusions.      Objective:   Chief Complaint:   Chief Complaint   Patient presents with    Abdomen/Flank Pain    Chest Pain     ----------------------------------  Temp:  [97.5 °F (36.4 °C)-98.5 °F (36.9 °C)] 98.2 °F (36.8 °C)  Pulse:  [60-94] 61  Resp:  [18-20] 18  BP: (132-149)/(85-94) 134/93  SpO2:  [93 %-96 %] 93 %  Gen: A+Ox3.  No distress.   HEENT: NCAT, neck supple, no carotid bruit.  CV: RRR, S1S2, and intact distal pulses. No gallop, rub, murmur.  Pulm: Effort and breath sounds normal. No distress, wheezes, rales, rhonchi.  Abd: Soft, no epigastric tenderness to palpation without rebound or guarding.  Neuro: Normal reflexes, CN. Sensory/motor exams grossly normal deficit.   MS: No joint effusions.  No peripheral edema.  Skin: Skin is warm and dry. No rashes, erythema, diaphoresis.   Psych: Normal mood and affect. Calm, cooperative    Labs:  Lab Results   Component Value Date    HGB 16.1 10/18/2024    WBC 11.4 (H) 10/18/2024    .0 10/18/2024     10/19/2024    K 3.9 10/19/2024    CREATSERUM 1.04 10/19/2024    AST 32 10/20/2024    ALT 49 10/20/2024            QUEtiapine  100 mg Oral Nightly    ARIPiprazole  5 mg Oral Daily    pantoprazole  40 mg Oral QAM AC    gabapentin  100 mg Oral TID    enoxaparin  40 mg Subcutaneous Daily    thiamine  100 mg Oral Daily    multivitamin  1 tablet Oral Daily    folic acid  1 mg Oral Daily    propranolol  10 mg Oral BID       diphenhydrAMINE    LORazepam    acetaminophen    prochlorperazine    influenza virus vaccine PF

## 2024-10-20 NOTE — PROGRESS NOTES
Patient is a 39 year old  single male lives with his mom with history of high, alcoholism and mood disorder who has presented to the hospital for chest pain.  Patient has been demonstrating withdrawal symptoms and anxiety, psych consult requested for evaluation and advice.    Consult Duration     The patient seen for over 50 minutes, further evaluation, over 50% counseling and coordinating care addressing alcohol withdrawal, mood alternation and anxiety.    Record reviewed, communication with attending, communication with RN and patient seen face to face evaluation.    History of Present Illness:     According to taking the patient has been doing well with no withdrawal symptoms.    The patient seen today in the presence of his mom by his permission.    The patient presented calm and cooperative, friendly and assertive.    Patient confirmed his treatment plan by traveling tomorrow to [place] for engaging in rehab a program.    The patient focusing today in his discussion on the severe panic attack which perpetuate the indication for alcohol and made him failed multiple time.  Patient admitted that he has been doing much better during this admission and has been feeling his mind has been less racing with improvement in his sleep but still only 4-hour?  Patient reported that he did not sleep until he took some Ativan yesterday for an anxiety and not for withdrawal.    Otherwise the patient admitted that hydroxyzine does work for his sleep to.  Patient aware that he should not be discharged on any benzo.  Patient presented cooperative and reasonable expectation otherwise highly appreciative affect with focusing on his emotional wellbeing.    Mom was very supportive and encouraging the treatment although have skeptical feeling about the outcome for the patient have multiple previous rehabs failed.    Patient denied any suicidal homicidal ideation.  Patient denies any auditory or visual  hallucination.  Patient denied any side effect to the medication.  Patient denied history of inpatient treatment.    Past Psychiatric/Medication History:  1. Prior diagnoses: Anxiety, alcoholism and mood disorder  2. Past psychiatric inpatient: None  3. Past outpatient history: Patient admitted seeing psychiatrist outpatient and admitted at the next  program for rehab  4. Past suicide history: None  5. Medication history: None currently    Social History:   Patient is single male lives with his mom who works on daily basis.  Patient reported that he smokes 1 pack/day but he quit yesterday when he came to the hospital?  Patient drink 1 gallon of vodka on daily basis for many years.  Patient denied substance abuse.    Family History:  Patient denied any knowledge of psychiatric family history  Medical History:   Past Medical History  Past Medical History:    ETOH abuse    MI (myocardial infarction) (HCC)       Past Surgical History  History reviewed. No pertinent surgical history.    Family History  Family History   Problem Relation Age of Onset    Psychiatric Father         alcoholic    Other (Other) Father         dementia       Social History  Social History     Socioeconomic History    Marital status: Single   Tobacco Use    Smoking status: Every Day     Current packs/day: 1.00     Average packs/day: 1 pack/day for 20.8 years (20.8 ttl pk-yrs)     Types: Cigarettes     Start date: 2004    Smokeless tobacco: Never   Vaping Use    Vaping status: Never Used   Substance and Sexual Activity    Alcohol use: Yes     Comment: 2 pints of vodka per day. last drink 9am 10/17    Drug use: Never     Social Drivers of Health     Food Insecurity: Food Insecurity Present (10/17/2024)    Food Insecurity     Food Insecurity: Often true   Transportation Needs: Unmet Transportation Needs (10/17/2024)    Transportation Needs     Lack of Transportation: Yes   Housing Stability: Medium Risk (10/17/2024)    Housing Stability     Housing  Instability: Yes     Housing Instability Emergency: No           Current Medications:  Current Facility-Administered Medications   Medication Dose Route Frequency    hydrOXYzine (Atarax) tab 50 mg  50 mg Oral Nightly    hydrOXYzine (Atarax) tab 25 mg  25 mg Oral Daily PRN    diphenhydrAMINE (Benadryl) cap/tab 25 mg  25 mg Oral Nightly PRN    QUEtiapine (SEROquel) tab 100 mg  100 mg Oral Nightly    LORazepam (Ativan) tab 1 mg  1 mg Oral Q6H PRN    ARIPiprazole (Abilify) tab 5 mg  5 mg Oral Daily    pantoprazole (Protonix) DR tab 40 mg  40 mg Oral QAM AC    gabapentin (Neurontin) cap 100 mg  100 mg Oral TID    enoxaparin (Lovenox) 40 MG/0.4ML SUBQ injection 40 mg  40 mg Subcutaneous Daily    acetaminophen (Tylenol Extra Strength) tab 500 mg  500 mg Oral Q4H PRN    prochlorperazine (Compazine) 10 MG/2ML injection 5 mg  5 mg Intravenous Q8H PRN    thiamine (Vitamin B1) tab 100 mg  100 mg Oral Daily    multivitamin (Tab-A-Debbie/Beta Carotene) tab 1 tablet  1 tablet Oral Daily    folic acid (Folvite) tab 1 mg  1 mg Oral Daily    influenza virus trivalent PF (Fluzone Trivalent) 0.5 mL IM injection (ages 6 months to 64 years) 0.5 mL  0.5 mL Intramuscular Prior to discharge    propranolol (Inderal) tab 10 mg  10 mg Oral BID     Medications Prior to Admission   Medication Sig    propranolol 10 MG Oral Tab Take 1 tablet (10 mg total) by mouth 2 (two) times daily.       Allergies  Allergies[1]    Review of Systems:   As by Admitting/Attending    Results:   Laboratory Data:  Lab Results   Component Value Date    WBC 11.4 (H) 10/18/2024    HGB 16.1 10/18/2024    HCT 46.3 10/18/2024    .0 10/18/2024    CREATSERUM 1.04 10/19/2024    BUN 17 10/19/2024     10/19/2024    K 3.9 10/19/2024     10/19/2024    CO2 28.0 10/19/2024     (H) 10/19/2024    CA 9.6 10/19/2024    ALB 4.3 10/20/2024    ALKPHO 87 10/20/2024    TP 6.9 10/20/2024    AST 32 10/20/2024    ALT 49 10/20/2024    LIP 50 10/17/2024    MG 2.2  10/17/2024         Imaging:  No results found.    Vital Signs:   Blood pressure (!) 144/96, pulse 69, temperature 98.5 °F (36.9 °C), temperature source Oral, resp. rate 18, height 67\", weight 85.8 kg (189 lb 1.6 oz), SpO2 94%.    Mental Status Exam:   Appearance: Stated age male, in hospital gown, laying down in hospital bed.  Psychomotor: No psychomotor agitation, or retardation.  No tremors observed  Orientation: Alert and oriented to person, place, time and condition.  Gait: Intact  Attitude/Coorperation: Cooperative and attentive.  Behavior: Appropriate.  Speech: Regular rate and rhythm speech.  Mood: Patient reporting depressed and anxious mood.  Affect: Anxious affect congruent with the mood.  Thought process: Linear and appropriate.  Thought content: Patient denies any suicidal or homicidal ideation.  Perceptions: Patient denied any auditory visual hallucination.  Concentration: Grossly intact.  Memory: Grossly intact.  Intellect: Average.  Judgment and Insight: Patient demonstrated appropriate judgment and insight with pathology and the need for the rehab and stabilizing his mood.    Impression:     Alcohol withdrawal syndrome, uncomplicated.  Alcohol dependence, continuous.  Moderate bipolar 1 disorder most recent episode depression.  Panic disorder  Chest pain of uncertain etiology        The patient is a 39-year-old  single male lives with his mom with history of alcoholism, mood disorder and recent heart attack 6 months ago who presented to the hospital with chest pain.  Patient admitted significant history of alcohol dependency on daily basis and drinks close to 1 gallon of vodka.  Patient denies any history of seizure or DTs.    Also patient reporting history of depression with recent episode otherwise alternated with mood swings, difficulty sleeping, racing thoughts, irritability and distractibility.    10/19/2024: The patient has not been demonstrating symptom of withdrawal otherwise his  emotion, anxiety and difficulty sleeping has been a factor need to be addressed.    10/20/2024: The patient continued demonstrate improvement in his detox.  He is ready to be discharged tomorrow to travel to [place] for rehab.  Patient discussed medication and we agreed to send medication Rx today to be ready for him before travel tomorrow.    Discussed risk and benefit, acknowledging the current symptom and severity.  At this point, I would recommend the following approach:     Focus on safety.  Okay for discharge on Monday morning  Focus on education and support.  Focus on insight orientation helping the patient understand diagnosis and treatment plan.  Discontinue CIWA protocol.  Continue Abilify 5 mg daily.  Continue gabapentin 100 mg 3 times daily.  Continue Seroquel 100 mg nightly.  Start hydroxyzine 50 mg nightly.  Start hydroxyzine 25 mg daily as needed for anxiety.  Continue thiamine 100 mg daily  Processed with patient at length, the initiation of the above psychotropic medications I advised the patient of the risks, benefits, alternatives and potential side effects. The patient consents to administration of the medications and understands the right to refuse medications at any time. The patient verbalized understanding.   Coordinate plan with team    Orders This Visit:  Orders Placed This Encounter   Procedures    CBC With Differential With Platelet    Hepatic Function Panel (7)    Basic Metabolic Panel (8)    Lipase    Troponin I (High Sensitivity)    Troponin I (High Sensitivity)    Magnesium    Comp Metabolic Panel (14)    CBC With Differential With Platelet    Comp Metabolic Panel (14)    Hepatic Function Panel (7)       Meds This Visit:  Requested Prescriptions     Signed Prescriptions Disp Refills    ARIPiprazole 5 MG Oral Tab 30 tablet 1     Sig: Take 1 tablet (5 mg total) by mouth daily.    gabapentin 100 MG Oral Cap 90 capsule 1     Sig: Take 1 capsule (100 mg total) by mouth 3 (three) times  daily.    hydrOXYzine 25 MG Oral Tab 30 tablet 1     Sig: Take 1 tablet (25 mg total) by mouth daily as needed for Anxiety.    hydrOXYzine 50 MG Oral Tab 30 tablet 1     Sig: Take 1 tablet (50 mg total) by mouth at bedtime.    QUEtiapine 100 MG Oral Tab 30 tablet 1     Sig: Take 1 tablet (100 mg total) by mouth nightly.       Otoniel Christensen MD  10/20/2024    Note to Patient: The 21st Century Cures Act makes medical notes like these available to patients in the interest of transparency. However, be advised this is a medical document. It is intended as peer to peer communication. It is written in medical language and may contain abbreviations or verbiage that are unfamiliar. It may appear blunt or direct. Medical documents are intended to carry relevant information, facts as evident, and the clinical opinion of the practitioner. This note may have been transcribed using a voice dictation system. Voice recognition errors may occur. This should not be taken to alter the content or meaning of this note.            [1] No Known Allergies

## 2024-10-21 VITALS
OXYGEN SATURATION: 97 % | SYSTOLIC BLOOD PRESSURE: 142 MMHG | BODY MASS INDEX: 29.69 KG/M2 | TEMPERATURE: 98 F | HEIGHT: 67 IN | DIASTOLIC BLOOD PRESSURE: 86 MMHG | HEART RATE: 66 BPM | WEIGHT: 189.13 LBS | RESPIRATION RATE: 18 BRPM

## 2024-10-21 PROCEDURE — 99233 SBSQ HOSP IP/OBS HIGH 50: CPT | Performed by: OTHER

## 2024-10-21 PROCEDURE — 99239 HOSP IP/OBS DSCHRG MGMT >30: CPT | Performed by: HOSPITALIST

## 2024-10-21 RX ORDER — PANTOPRAZOLE SODIUM 40 MG/1
40 TABLET, DELAYED RELEASE ORAL
Qty: 14 TABLET | Refills: 0 | Status: SHIPPED | OUTPATIENT
Start: 2024-10-22

## 2024-10-21 NOTE — PLAN OF CARE
Pt A/Ox4, self care. PRN Atarax given for anxiety.     Problem: Patient Centered Care  Goal: Patient preferences are identified and integrated in the patient's plan of care  Description: Interventions:  - What would you like us to know as we care for you? I would like to return home.   - Provide timely, complete, and accurate information to patient/family  - Incorporate patient and family knowledge, values, beliefs, and cultural backgrounds into the planning and delivery of care  - Encourage patient/family to participate in care and decision-making at the level they choose  - Honor patient and family perspectives and choices  Outcome: Progressing     Problem: Patient/Family Goals  Goal: Patient/Family Long Term Goal  Description: Patient's Long Term Goal: Get stronger.     Interventions:  - Ambulate often.   - See additional Care Plan goals for specific interventions  Outcome: Progressing  Goal: Patient/Family Short Term Goal  Description: Patient's Short Term Goal: Ask frequent questions.     Interventions:   - Work with staff on ways to improve overall health status.   - See additional Care Plan goals for specific interventions  Outcome: Progressing     Problem: SAFETY ADULT - FALL  Goal: Free from fall injury  Description: INTERVENTIONS:  - Assess pt frequently for physical needs  - Identify cognitive and physical deficits and behaviors that affect risk of falls.  - [place] fall precautions as indicated by assessment.  - Educate pt/family on patient safety including physical limitations  - Instruct pt to call for assistance with activity based on assessment  - Modify environment to reduce risk of injury  - Provide assistive devices as appropriate  - Consider OT/PT consult to assist with strengthening/mobility  - Encourage toileting schedule  Outcome: Progressing     Problem: GASTROINTESTINAL - ADULT  Goal: Minimal or absence of nausea and vomiting  Description: INTERVENTIONS:  - Maintain adequate hydration with  IV or PO as ordered and tolerated  - Nasogastric tube to low intermittent suction as ordered  - Evaluate effectiveness of ordered antiemetic medications  - Provide nonpharmacologic comfort measures as appropriate  - Advance diet as tolerated, if ordered  - Obtain nutritional consult as needed  - Evaluate fluid balance  Outcome: Progressing  Goal: Maintains adequate nutritional intake (undernourished)  Description: INTERVENTIONS:  - Monitor percentage of each meal consumed  - Identify factors contributing to decreased intake, treat as appropriate  - Assist with meals as needed  - Monitor I&O, WT and lab values  - Obtain nutritional consult as needed  - Optimize oral hygiene and moisture  - Encourage food from home; allow for food preferences  - Enhance eating environment  Outcome: Progressing     Problem: METABOLIC/FLUID AND ELECTROLYTES - ADULT  Goal: Electrolytes maintained within normal limits  Description: INTERVENTIONS:  - Monitor labs and rhythm and assess patient for signs and symptoms of electrolyte imbalances  - Administer electrolyte replacement as ordered  - Monitor response to electrolyte replacements, including rhythm and repeat lab results as appropriate  - Fluid restriction as ordered  - Instruct patient on fluid and nutrition restrictions as appropriate  Outcome: Progressing

## 2024-10-21 NOTE — DISCHARGE PLANNING
Patient was provided with discharge instructions, education, and follow up information. Prescriptions were already sent electronically to patient's pharmacy. Patient verbalizes understanding of follow up information, specifically PCP. Patient has no questions after reviewing all instructions and will be going home.     Lala TRUJILLO, Discharge Leader [id number]

## 2024-10-21 NOTE — PROGRESS NOTES
Patient walked to exit with his friend to transport patient home. Patient carrying his belongings. No belongings left in room.

## 2024-10-21 NOTE — PLAN OF CARE
Problem: Patient Centered Care  Goal: Patient preferences are identified and integrated in the patient's plan of care  Description: Interventions:  - What would you like us to know as we care for you? I live with my mother.  - Provide timely, complete, and accurate information to patient/family  - Incorporate patient and family knowledge, values, beliefs, and cultural backgrounds into the planning and delivery of care  - Encourage patient/family to participate in care and decision-making at the level they choose  - Honor patient and family perspectives and choices  Outcome: Progressing     Problem: Patient/Family Goals  Goal: Patient/Family Long Term Goal  Description: Patient's Long Term Goal: To go home    Interventions:  - Monitor labs and vital signs  - Follow provider recommendations  - See additional Care Plan goals for specific interventions  Outcome: Progressing  Goal: Patient/Family Short Term Goal  Description: Patient's Short Term Goal: To decrease my anxiety    Interventions:   - Psych consult  - Medication compliance  - Follow provider recommendations  - See additional Care Plan goals for specific interventions  Outcome: Progressing     Problem: SAFETY ADULT - FALL  Goal: Free from fall injury  Description: INTERVENTIONS:  - Assess pt frequently for physical needs  - Identify cognitive and physical deficits and behaviors that affect risk of falls.  - [place] fall precautions as indicated by assessment.  - Educate pt/family on patient safety including physical limitations  - Instruct pt to call for assistance with activity based on assessment  - Modify environment to reduce risk of injury  - Provide assistive devices as appropriate  - Consider OT/PT consult to assist with strengthening/mobility  - Encourage toileting schedule  Outcome: Progressing     Problem: GASTROINTESTINAL - ADULT  Goal: Minimal or absence of nausea and vomiting  Description: INTERVENTIONS:  - Maintain adequate hydration with IV  or PO as ordered and tolerated  - Nasogastric tube to low intermittent suction as ordered  - Evaluate effectiveness of ordered antiemetic medications  - Provide nonpharmacologic comfort measures as appropriate  - Advance diet as tolerated, if ordered  - Obtain nutritional consult as needed  - Evaluate fluid balance  Outcome: Progressing  Goal: Maintains adequate nutritional intake (undernourished)  Description: INTERVENTIONS:  - Monitor percentage of each meal consumed  - Identify factors contributing to decreased intake, treat as appropriate  - Assist with meals as needed  - Monitor I&O, WT and lab values  - Obtain nutritional consult as needed  - Optimize oral hygiene and moisture  - Encourage food from home; allow for food preferences  - Enhance eating environment  Outcome: Progressing     Problem: METABOLIC/FLUID AND ELECTROLYTES - ADULT  Goal: Electrolytes maintained within normal limits  Description: INTERVENTIONS:  - Monitor labs and rhythm and assess patient for signs and symptoms of electrolyte imbalances  - Administer electrolyte replacement as ordered  - Monitor response to electrolyte replacements, including rhythm and repeat lab results as appropriate  - Fluid restriction as ordered  - Instruct patient on fluid and nutrition restrictions as appropriate  Outcome: Progressing

## 2024-10-21 NOTE — DISCHARGE SUMMARY
[facility]  part of [facility]     DISCHARGE SUMMARY     Srinath Tamayo Patient Status:  Inpatient    4/10/1985 MRN [id number]   Location [facility] 3W/SW Attending Christophe Wood MD   Hosp Day # 4 PCP No primary care provider on file.     DATE OF ADMISSION: 10/17/2024  DATE OF DISCHARGE:  10/21/24  DISPOSITION: home  CONDITION ON DISCHARGE: good    DISCHARGE DIAGNOSES:  Acute alcohol withdrawal  Alcohol abuse  Alcoholic gastritis  Abnormal LFTs    HISTORY OF PRESENT ILLNESS (COPIED FROM ADMISSION H&P)  Patient is a 39-year-old  male who presented to the emergency department for evaluation of midsternal chest pain. He has been on drinking binge since he was released from an alcohol rehab facility 3 days ago. He has been consuming, according to his report, around 1 gallon of vodka on a daily basis. Last drink was this morning. Today, came into the emergency department for alcohol withdrawal but instead he told the ER physician that he has chest pain and he had a stent in the past. Later on, he retreated his statement, and he was lying about the stent and the chest discomfort. CBC and chemistry, lipase and troponin were unremarkable except for leukocytosis of 15. Chest x-ray showed no acute findings. EKG showed normal sinus rhythm.     HOSPITAL COURSE:  Patient was admitted, placed on a detox protocol.  He actually required very little in the way of medications for this.  Seen by psychiatry.  Medications adjusted for anxiety.  Detox protocol discontinued by psychiatry.  He had mild alcoholic gastritis and mild elevation of his LFTs which both improved prior to discharge.  He will continued 2 weeks of PPI.  Counseled on alcohol abstinence    Patient understands return to the emergency room for increased pain, fever, discharge, shortness of breath, chest pain, new neurologic symptoms, other concerning symptoms.    PHYSICAL EXAM:  Temp:  [98.2 °F (36.8 °C)-98.5 °F (36.9 °C)] 98.3  °F (36.8 °C)  Pulse:  [60-71] 60  Resp:  [18] 18  BP: (118-144)/(79-96) 118/79  SpO2:  [93 %-96 %] 95 %  Gen: A+Ox3.  No distress.   HEENT: NCAT, neck supple, no carotid bruit.  CV: RRR, S1S2, and intact distal pulses. No gallop, rub, murmur.  Pulm: Effort and breath sounds normal. No distress, wheezes, rales, rhonchi.  Abd: Soft, NTND, BS normal, no mass, no HSM, no rebound/guarding.   Neuro: Normal reflexes, CN. Sensory/motor exams grossly normal deficit. Coordination  and gait normal.   MS: No joint effusions.  No peripheral edema.  Skin: Skin is warm and dry. No rashes, erythema, diaphoresis.   Psych: Normal mood and affect. Behavior and judgment normal.     DISCHARGE MEDICATIONS     Discharge Medications        START taking these medications        Instructions Prescription details   ARIPiprazole 5 MG Tabs  Commonly known as: Abilify      Take 1 tablet (5 mg total) by mouth daily.   Quantity: 30 tablet  Refills: 1     gabapentin 100 MG Caps  Commonly known as: Neurontin      Take 1 capsule (100 mg total) by mouth 3 (three) times daily.   Quantity: 90 capsule  Refills: 1     hydrOXYzine 25 MG Tabs  Commonly known as: Atarax      Take 1 tablet (25 mg total) by mouth daily as needed for Anxiety.   Quantity: 30 tablet  Refills: 1     hydrOXYzine 50 MG Tabs  Commonly known as: Atarax      Take 1 tablet (50 mg total) by mouth at bedtime.   Quantity: 30 tablet  Refills: 1     pantoprazole 40 MG Tbec  Commonly known as: Protonix  Start taking on: October 22, 2024      Take 1 tablet (40 mg total) by mouth every morning before breakfast.   Quantity: 14 tablet  Refills: 0     QUEtiapine 100 MG Tabs  Commonly known as: SEROquel      Take 1 tablet (100 mg total) by mouth nightly.   Quantity: 30 tablet  Refills: 1            CONTINUE taking these medications        Instructions Prescription details   propranolol 10 MG Tabs  Commonly known as: Inderal      Take 1 tablet (10 mg total) by mouth 2 (two) times daily.   Refills:  0               Where to Get Your Medications        These medications were sent to [id number] - [place], [place] - [address] [facility] [phone], [phone]  [address], [facility] [address]      Phone: [phone]   ARIPiprazole 5 MG Tabs  gabapentin 100 MG Caps  hydrOXYzine 25 MG Tabs  hydrOXYzine 50 MG Tabs  pantoprazole 40 MG Tbec  QUEtiapine 100 MG Tabs         CONSULTANTS  Consultants         Provider   Role Specialty     Otoniel Christensen MD      Consulting Physician Psychiatry            FOLLOW UP:  PCP    Follow up in 1 week(s)  Post Discharge Followup    The above plan and follow-up instructions were reviewed with the patient and they verbalized understanding and agreement.  They understand to return to the emergency room for any concerning signs or symptoms.  Greater than 30 minutes spent on discharge.  -----------------------    Hospital Discharge Diagnoses: Acute alcohol withdrawal    Lace+ Score: 23  59-90 High Risk  29-58 Medium Risk  0-28   Low Risk.    TCM Follow-Up Recommendation:  LACE < 29: Low Risk of readmission after discharge from the hospital. No TCM follow-up needed.

## 2024-10-22 NOTE — PAYOR COMM NOTE
--------------  CONTINUED STAY REVIEW---REQUESTING ADDITIONAL DAYS 10/19 10/20       Payor: MARKOS CASTANEDA  Subscriber #:  [id number]  Authorization Number: [id number]    Admit date: 10/17/24  Admit time:  1:39 PM      10/19           Date of Service: 10/19/2024  8:09 AM     Signed              Assessment & Plan:   ----------------------------------  Acute alcohol withdrawal.  Symptoms are mild.  Tremor is not present.  Delirium not present.  History of alcohol withdrawal seizures 20yrs ago present. Doing well overall.  -Detox protocol  -Psychiatry consultation  -Telemetry  -Restraints if needed  -Social work for resources  -Thiamine replacement     Alcohol abuse.  Currently with mild signs or symptoms of withdrawal  -Monitor for tremor, tachycardia, psychosis, anxiety  -Alcohol cessation encouraged  -Social work for resources  -Psychiatry consult     Alcoholic gastritis.  Mild.  Nausea but no vomiting.  No hematemesis or melena.  -Protonix  -Diet as tolerated     Abnormal LFTs.  Alcohol related.  Bilirubin and transaminases slightly elevated but overall mild.  -Recheck LFTs in the morning, still pending     Chest pain.  Initially complained about chest pain in the ER though patient admits that this was not the case.  He also stated that he had stents placed but this was also not true, patient says that he was anxious and started making things up. No recurrence  -expectant management     DVT Mechanical Prophylaxis:            DVT Pharmacologic Prophylaxis   Medication    enoxaparin (Lovenox) 40 MG/0.4ML SUBQ injection 40 mg               code status: full   dispo: home upon medical clearance  If applicable, malnutrition status at bottom of note     I personally reviewed the available laboratories, imaging including. I discussed/will discuss the case with consultants. I ordered laboratories and/or radiographic studies. I adjusted medications as detailed above.  Medical decision making high, risk is high.     Subjective:    ----------------------------------  Feeling better overall.  Minimal abdominal pain, improved from yesterday again.  Not much appetite.  No tremor.  No hallucinations or delusions.        Objective:   Chief Complaint:       Chief Complaint   Patient presents with    Abdomen/Flank Pain    Chest Pain      ----------------------------------  Temp:  [97.1 °F (36.2 °C)-98.3 °F (36.8 °C)] 98.2 °F (36.8 °C)  Pulse:  [] 68  Resp:  [20] 20  BP: (115-152)/() 136/101  SpO2:  [94 %-97 %] 96 %  Gen: A+Ox3.  No distress.   HEENT: NCAT, neck supple, no carotid bruit.  CV: RRR, S1S2, and intact distal pulses. No gallop, rub, murmur.  Pulm: Effort and breath sounds normal. No distress, wheezes, rales, rhonchi.  Abd: Soft, minimal epigastric tenderness to palpation without rebound or guarding.  Neuro:  Normal reflexes, CN. Sensory/motor exams grossly normal deficit.   MS: No joint effusions.  No peripheral edema.  Skin: Skin is warm and dry. No rashes, erythema, diaphoresis.   Psych:   Normal mood and affect. Calm, cooperative     Labs:        Lab Results   Component Value Date     HGB 16.1 10/18/2024     WBC 11.4 (H) 10/18/2024     .0 10/18/2024      10/18/2024     K 3.9 10/18/2024     CREATSERUM 1.08 10/18/2024     AST 69 (H) 10/18/2024     ALT 76 (H) 10/18/2024              pantoprazole  40 mg Oral QAM AC    gabapentin  100 mg Oral TID    QUEtiapine  50 mg Oral Nightly    ARIPiprazole  2 mg Oral Daily    enoxaparin  40 mg Subcutaneous Daily    thiamine  100 mg Oral Daily    multivitamin  1 tablet Oral Daily    folic acid  1 mg Oral Daily    propranolol  10 mg Oral BID                            10/20          Date of Service: 10/20/2024  9:02 AM            Assessment & Plan:   ----------------------------------  Acute alcohol withdrawal.    -dc detox protocol per psych recs  -Psychiatry consultation dolores  -Telemetry  -Thiamine replacement     Alcohol abuse.  Currently with mild signs or symptoms of  withdrawal  -Monitor for tremor, tachycardia, psychosis, anxiety  -Alcohol cessation encouraged  -Social work for resources  -Psychiatry consult     Alcoholic gastritis.  Mild.  Nausea but no vomiting.  No hematemesis or melena. Resolved by sxs  -Protonix  -Diet as tolerated     Abnormal LFTs.  Alcohol related.  Bilirubin and transaminases much better  -Recheck LFTs if clinical change     Chest pain.  Initially complained about chest pain in the ER though patient admits that this was not the case.  He also stated that he had stents placed but this was also not true, patient says that he was anxious and started making things up. No recurrence  -expectant management     DVT Mechanical Prophylaxis:            DVT Pharmacologic Prophylaxis   Medication    enoxaparin (Lovenox) 40 MG/0.4ML SUBQ injection 40 mg                Subjective:   ----------------------------------  Feeling better overall.  Resolved abdominal pain, improved from yesterday again.  Improved appetite.  No tremor.  No hallucinations or delusions.        Objective:   Chief Complaint:       Chief Complaint   Patient presents with    Abdomen/Flank Pain    Chest Pain      ----------------------------------  Temp:  [97.5 °F (36.4 °C)-98.5 °F (36.9 °C)] 98.2 °F (36.8 °C)  Pulse:  [60-94] 61  Resp:  [18-20] 18  BP: (132-149)/(85-94) 134/93  SpO2:  [93 %-96 %] 93 %  Gen: A+Ox3.  No distress.   HEENT: NCAT, neck supple, no carotid bruit.  CV: RRR, S1S2, and intact distal pulses. No gallop, rub, murmur.  Pulm: Effort and breath sounds normal. No distress, wheezes, rales, rhonchi.  Abd: Soft, no epigastric tenderness to palpation without rebound or guarding.  Neuro:  Normal reflexes, CN. Sensory/motor exams grossly normal deficit.   MS: No joint effusions.  No peripheral edema.  Skin: Skin is warm and dry. No rashes, erythema, diaphoresis.   Psych:   Normal mood and affect. Calm, cooperative     Labs:        Lab Results   Component Value Date     HGB 16.1  10/18/2024     WBC 11.4 (H) 10/18/2024     .0 10/18/2024      10/19/2024     K 3.9 10/19/2024     CREATSERUM 1.04 10/19/2024     AST 32 10/20/2024     ALT 49 10/20/2024              QUEtiapine  100 mg Oral Nightly    ARIPiprazole  5 mg Oral Daily    pantoprazole  40 mg Oral QAM AC    gabapentin  100 mg Oral TID    enoxaparin  40 mg Subcutaneous Daily    thiamine  100 mg Oral Daily    multivitamin  1 tablet Oral Daily    folic acid  1 mg Oral Daily    propranolol  10 mg Oral BID                  Vitals (last day) before discharge       Date/Time Temp Pulse Resp BP SpO2 Weight O2 Device O2 Flow Rate (L/min) [facility]    10/21/24 1325 98.2 °F (36.8 °C) 66 18 142/86 97 % -- None (Room air) -- CK    10/21/24 1155 -- 68 -- -- -- -- -- -- RW    10/21/24 0754 98 °F (36.7 °C) 56 16 134/92 97 % -- None (Room air) -- CK    10/21/24 0531 98.3 °F (36.8 °C) 60 18 118/79 95 % -- None (Room air) -- TJ    10/20/24 2247 -- 61 18 132/83 96 % -- None (Room air) -- TJ    10/20/24 2105 98.4 °F (36.9 °C) 71 18 131/86 95 % -- None (Room air) -- TJ    10/20/24 1300 98.5 °F (36.9 °C) 69 18 144/96 94 % -- None (Room air) -- FL    10/20/24 0851 98.2 °F (36.8 °C) 61 18 134/93 93 % -- None (Room air) -- FL    10/20/24 0445 97.5 °F (36.4 °C) 60 18 132/87 96 % -- None (Room air) -- MI    10/20/24 0300 -- -- -- -- -- 189 lb 1.6 oz (85.8 kg) -- -- BP          CIWA Scores (last 5 days) before discharge       Date/Time CIWA-Ar Total Who    10/19/24 1200 3 NN    10/19/24 1000 2 NN    10/19/24 0837 7 NN    10/19/24 0400 2 MI    10/19/24 0200 2 MI    10/19/24 0000 2 MI    10/18/24 2200 2 MI    10/18/24 2000 3 MI    10/18/24 1800 5 JL    10/18/24 1600 2 JL    10/18/24 1400 2 JL    10/18/24 1200 2 JL    10/18/24 1000 3 JL    10/18/24 0900 7 JL    10/18/24 0600 6 JH    10/18/24 0400 0 JH    10/18/24 0200 0 JH    10/18/24 0000 0 JH    10/17/24 2200 0 JH    10/17/24 2100 11 JH    10/17/24 2006 12 JH    10/17/24 1828 12 RS    10/17/24 1558 12 RS     10/17/24 1343 19 RS    10/17/24 1227 6 KS    10/17/24 1138 13 AG                Medication Administration Report  for Phuc Tamayo as of 10/12/24 through 10/21/24  Legend:       Medications 10/12 10/13 10/14 10/15 10/16 10/17 10/18 10/19 10/20 10/21   Completed Medications   famotidine (Pepcid) 20 mg/2mL injection 20 mg  Dose: 20 mg  Freq: Once Route: IV  Start: 10/17/24 1039 End: 10/17/24 1109         [id number]            ketorolac (Toradol) 15 MG/ML injection 15 mg  Dose: 15 mg  Freq: Once Route: IV  Start: 10/17/24 1114 End: 10/17/24 1137         [id number]            LORazepam (Ativan) 2 mg/mL injection 2 mg  Dose: 2 mg  Freq: Once Route: IV  Start: 10/17/24 1200 End: 10/17/24 1203   Admin Instructions:   For IV use dilute 1:1 with saline         [id number]            thiamine 100 mg/mL injection 100 mg  Dose: 100 mg  Freq: Once Route: IV  Start: 10/17/24 1345 End: 10/17/24 1424   Admin Instructions:   If pt did not already receive dose.  Must administer prior to starting IV fluids with dextrose  For IV Push Doses: Administer slowly over 1 to 2 minute         98991            Discontinued Medications   Medications 10/12 10/13 10/14 10/15 10/16 10/17 10/18 10/19 10/20 10/21                ARIPiprazole (Abilify) tab 2 mg  Dose: 2 mg  Freq: Daily Route: OR  Start: 10/18/24 1400 End: 10/19/24 2321          [id number]      04174          ARIPiprazole (Abilify) tab 5 mg  Dose: 5 mg  Freq: Daily Route: OR  Start: 10/20/24 0930 End: 10/21/24 1727            11889      17670        diphenhydrAMINE (Benadryl) cap/tab 25 mg  Dose: 25 mg  Freq: Nightly PRN Route: OR  PRN Reason: Sleep  Start: 10/19/24 1409 End: 10/21/24 0995 96811          enoxaparin (Lovenox) 40 MG/0.4ML SUBQ injection 40 mg  Dose: 40 mg  Freq: Daily Route: SC  Start: 10/17/24 1345 End: 10/21/24 1727   Admin Instructions:   Only administer Enoxaparin subcutaneously into the abdomen unless directed otherwise by a physician. Alternate injection sites between the  left and right anterolateral and left and right posterolateral abdominal wall.         [id number]      [id number]      [id number]      103568      955600        folic acid (Folvite) tab 1 mg  Dose: 1 mg  Freq: Daily Route: OR  Start: 10/17/24 1345 End: 10/21/24 1727         113994      956916      694968      939778      281278        gabapentin (Neurontin) cap 100 mg  Dose: 100 mg  Freq: 3 times daily Route: OR  Start: 10/18/24 1600 End: 10/21/24 1727          [id number]     237070      787128     468211     (2100)24      090015     921352     440132      040368     29        hydrOXYzine (Atarax) tab 25 mg  Dose: 25 mg  Freq: DAILY PRN Route: OR  PRN Reason: Anxiety  PRN Comment: first line  Start: 10/20/24 1333 End: 10/21/24 1727            [id number]         hydrOXYzine (Atarax) tab 50 mg  Dose: 50 mg  Freq: Nightly Route: OR  Start: 10/20/24 2100 End: 10/21/24 1727            [id number]                       LORazepam (Ativan) tab 1 mg  Dose: 1 mg  Freq: Every 1 hour PRN Route: OR  PRN Comment: for CIWA 7-9  Start: 10/17/24 1358 End: 10/19/24 2321   Admin Instructions:   Give every 1 hour until CIWA is less than 7.  Reassess CIWA score every 1 hour and as needed for signs of worsening alcohol withdrawal.  If unable to tolerate oral, may give via IV.         33      [id number]                                                                Or   LORazepam (Ativan) 2 mg/mL injection 3 mg  Dose: 3 mg  Freq: Every 1 hour PRN Route: IV  PRN Comment: for CIWA 15 to 19  Start: 10/17/24 1358 End: 10/19/24 2321   Admin Instructions:   Give every 1 hour until CIWA is less than 15.  Reassess CIWA score every 1 hour and as needed for signs of worsening alcohol withdrawal.          [id number]                                                   LORazepam (Ativan) tab 1 mg  Dose: 1 mg  Freq: Every 6 hours PRN Route: OR  PRN Reason: Anxiety  Start: 10/19/24 1722 End: 10/21/24 1727            [id number]     [id number]          LORazepam (Ativan) tab 1 mg  Dose: 1  mg  Freq: Every 1 hour PRN Route: OR  PRN Comment: For CIWA 7-14  Start: 10/17/24 1340 End: 10/20/24 0804   Admin Instructions:   CIWA 7-14: Give every hour until CIWA is less than 7.  Tablets can be crushed prior to administration. If patient unable to tolerate oral route, contact provider         [id number] [phone]                                  multivitamin (Tab-A-Debbie/Beta Carotene) tab 1 tablet  Dose: 1 tablet  Freq: Daily Route: OR  Start: 10/17/24 1345 End: 10/21/24 1727         [id number]      795206      088042      800681      814844        ondansetron (Zofran) 4 MG/2ML injection 4 mg  Dose: 4 mg  Freq: Every 6 hours PRN Route: IV  PRN Reasons: Nausea,vomiting  Start: 10/17/24 1340 End: 10/18/24 1351   Admin Instructions:   Default antiemetic sequence (unless otherwise preferred by patient):  1. ondansetron (Zofran)  2. prochlorperazine (Compazine). Wait 15 minutes before proceeding to next medication in sequence.  Follow therapeutic duplication policy.         [id number]            pantoprazole (Protonix) DR tab 40 mg  Dose: 40 mg  Freq: Every morning before breakfast Route: OR  Start: 10/18/24 0830 End: 10/21/24 1727   Admin Instructions:   Do not crush          [id number]      442912      792557      616906                     propranolol (Inderal) tab 10 mg  Dose: 10 mg  Freq: 2 times daily Route: OR  Start: 10/17/24 1400 End: 10/21/24 1727         960434     055705      999645     438643      108275     663164      856297     852336      020226        QUEtiapine (SEROquel) tab 100 mg  Dose: 100 mg  Freq: Nightly Route: OR  Start: 10/19/24 2100 End: 10/21/24 1727           [id number]      661594         QUEtiapine (SEROquel) tab 50 mg  Dose: 50 mg  Freq: Nightly Route: OR  Start: 10/18/24 2100 End: 10/19/24 1721          345952           thiamine (Vitamin B1) tab 100 mg  Dose: 100 mg  Freq: Daily Route: OR  Start: 10/18/24 0930 End: 10/21/24 1727          600452       224571      205066      855747        [facility] 10/12 10/13 10/14 10/15 10/16 10/17 10/18 10/19 10/20 10/21

## 2024-10-22 NOTE — PROGRESS NOTES
Patient is a 39 year old  single male lives with his mom with history of high, alcoholism and mood disorder who has presented to the hospital for chest pain.  Patient has been demonstrating withdrawal symptoms and anxiety, psych consult requested for evaluation and advice.    Consult Duration     The patient seen for over 50 minutes, further evaluation, over 50% counseling and coordinating care addressing alcohol withdrawal, mood alternation and anxiety.    Record reviewed, communication with attending, communication with RN and patient seen face to face evaluation.    History of Present Illness:     According to the team the patient has requested some Ativan last night but they gave him hydroxyzine 50 mg and he slept very restfully.    The patient seen this morning in his room.  The patient reporting \"I am rested before the work\".    Patient reporting that the sober home in [place] called him and they are ready for him.  Patient reported that his a friend is coming to pick him up at 1 PM.    The patient reporting that he has been feeling much better especially now his sleep has been structure well and he slept very well last night.    The patient admitted that this time he is very hopeful about the treatment for the fact he focused on his mood better than ever before.    Patient denied any panic attack or major anxiety otherwise admitted some concern about the treatment and fear of failure.    The patient denying any depressing symptom reporting that his energy is appropriate and he feel more secure that he is now on the right medication to help him through the journey.    Patient denied any side effect to the medication or any sedation.  Patient any tremor and did not demonstrate any.    Patient denied any suicidal homicidal ideation.  Patient denies any auditory or visual hallucination.  Patient denied any side effect to the medication.    Past Psychiatric/Medication History:  1. Prior diagnoses:  Anxiety, alcoholism and mood disorder  2. Past psychiatric inpatient: None  3. Past outpatient history: Patient admitted seeing psychiatrist outpatient and admitted at the [facility] program for rehab  4. Past suicide history: None  5. Medication history: None currently    Social History:   Patient is single male lives with his mom who works on daily basis.  Patient reported that he smokes 1 pack/day but he quit yesterday when he came to the hospital?  Patient drink 1 gallon of vodka on daily basis for many years.  Patient denied substance abuse.    Family History:  Patient denied any knowledge of psychiatric family history  Medical History:   Past Medical History  Past Medical History:    ETOH abuse    MI (myocardial infarction) (HCC)       Past Surgical History  History reviewed. No pertinent surgical history.    Family History  Family History   Problem Relation Age of Onset    Psychiatric Father         alcoholic    Other (Other) Father         dementia       Social History  Social History     Socioeconomic History    Marital status: Single   Tobacco Use    Smoking status: Every Day     Current packs/day: 1.00     Average packs/day: 1 pack/day for 20.8 years (20.8 ttl pk-yrs)     Types: Cigarettes     Start date: 2004    Smokeless tobacco: Never   Vaping Use    Vaping status: Never Used   Substance and Sexual Activity    Alcohol use: Yes     Comment: 2 pints of vodka per day. last drink 9am 10/17    Drug use: Never     Social Drivers of Health     Food Insecurity: Food Insecurity Present (10/17/2024)    Food Insecurity     Food Insecurity: Often true   Transportation Needs: Unmet Transportation Needs (10/17/2024)    Transportation Needs     Lack of Transportation: Yes   Housing Stability: Medium Risk (10/17/2024)    Housing Stability     Housing Instability: Yes     Housing Instability Emergency: No           Current Medications:  No current facility-administered medications for this encounter.     No medications  prior to admission.       Allergies  Allergies[1]    Review of Systems:   As by Admitting/Attending    Results:   Laboratory Data:  Lab Results   Component Value Date    WBC 11.4 (H) 10/18/2024    HGB 16.1 10/18/2024    HCT 46.3 10/18/2024    .0 10/18/2024    CREATSERUM 1.04 10/19/2024    BUN 17 10/19/2024     10/19/2024    K 3.9 10/19/2024     10/19/2024    CO2 28.0 10/19/2024     (H) 10/19/2024    CA 9.6 10/19/2024    ALB 4.3 10/20/2024    ALKPHO 87 10/20/2024    TP 6.9 10/20/2024    AST 32 10/20/2024    ALT 49 10/20/2024    LIP 50 10/17/2024    MG 2.2 10/17/2024         Imaging:  No results found.    Vital Signs:   Blood pressure 142/86, pulse 66, temperature 98.2 °F (36.8 °C), temperature source Oral, resp. rate 18, height 67\", weight 85.8 kg (189 lb 1.6 oz), SpO2 97%.    Mental Status Exam:   Appearance: Stated age male, in hospital gown, laying down in hospital bed.  Psychomotor: No psychomotor agitation, or retardation.  No tremors observed  Orientation: Alert and oriented to person, place, time and condition.  Gait: Intact  Attitude/Coorperation: Cooperative and attentive.  Behavior: Appropriate.  Patient demonstrates some appropriate anxiety  Speech: Regular rate and rhythm speech.  Mood: Patient reporting feeling positive otherwise some concerning anxiety.  Affect: Anxious affect congruent with the mood.  Thought process: Linear and appropriate.  Thought content: Patient denies any suicidal or homicidal ideation.  Perceptions: Patient denied any auditory visual hallucination.  Concentration: Grossly intact.  Memory: Grossly intact.  Intellect: Average.  Judgment and Insight: Patient demonstrated motivation to go to treatment center and happy that it might be a 90-day.    Impression:     Alcohol withdrawal syndrome, uncomplicated.  Resolved  Alcohol dependence, continuous.  Moderate bipolar 1 disorder most recent episode depression.  Panic disorder  Chest pain of uncertain  etiology        The patient is a 39-year-old  single male lives with his mom with history of alcoholism, mood disorder and recent heart attack 6 months ago who presented to the hospital with chest pain.  Patient admitted significant history of alcohol dependency on daily basis and drinks close to 1 gallon of vodka.  Patient denies any history of seizure or DTs.    Also patient reporting history of depression with recent episode otherwise alternated with mood swings, difficulty sleeping, racing thoughts, irritability and distractibility.    10/19/2024: The patient has not been demonstrating symptom of withdrawal otherwise his emotion, anxiety and difficulty sleeping has been a factor need to be addressed.    10/20/2024: The patient continued demonstrate improvement in his detox.  He is ready to be discharged tomorrow to travel to [place] for rehab.  Patient discussed medication and we agreed to send medication Rx today to be ready for him before travel tomorrow.    10/21/2024: The patient demonstrating significant improvement in his mood, no anxiety or panic attack and improvement in his sleep.  No withdrawal symptom has been reported or presented and patient is appropriate for discharge.    Discussed risk and benefit, acknowledging the current symptom and severity.  At this point, I would recommend the following approach:     Focus on safety.  Okay for discharge today  Focus on education and support.  Focus on insight orientation helping the patient understand diagnosis and treatment plan.  Continue Abilify 5 mg daily.  Continue gabapentin 100 mg 3 times daily.  Continue Seroquel 100 mg nightly.  Continue hydroxyzine 50 mg nightly.  Continue hydroxyzine 25 mg daily as needed for anxiety.  Continue thiamine 100 mg daily  Patient received his medication from outpatient pharmacy.  Coordinate plan with team    Orders This Visit:  Orders Placed This Encounter   Procedures    CBC With Differential With  Platelet    Hepatic Function Panel (7)    Basic Metabolic Panel (8)    Lipase    Troponin I (High Sensitivity)    Troponin I (High Sensitivity)    Magnesium    Comp Metabolic Panel (14)    CBC With Differential With Platelet    Comp Metabolic Panel (14)    Hepatic Function Panel (7)       Meds This Visit:  Requested Prescriptions     Signed Prescriptions Disp Refills    ARIPiprazole 5 MG Oral Tab 30 tablet 1     Sig: Take 1 tablet (5 mg total) by mouth daily.    gabapentin 100 MG Oral Cap 90 capsule 1     Sig: Take 1 capsule (100 mg total) by mouth 3 (three) times daily.    hydrOXYzine 25 MG Oral Tab 30 tablet 1     Sig: Take 1 tablet (25 mg total) by mouth daily as needed for Anxiety.    hydrOXYzine 50 MG Oral Tab 30 tablet 1     Sig: Take 1 tablet (50 mg total) by mouth at bedtime.    QUEtiapine 100 MG Oral Tab 30 tablet 1     Sig: Take 1 tablet (100 mg total) by mouth nightly.    pantoprazole 40 MG Oral Tab EC 14 tablet 0     Sig: Take 1 tablet (40 mg total) by mouth every morning before breakfast.       Otoniel Christensen MD  10/21/2024    Note to Patient: The 21st Century Cures Act makes medical notes like these available to patients in the interest of transparency. However, be advised this is a medical document. It is intended as peer to peer communication. It is written in medical language and may contain abbreviations or verbiage that are unfamiliar. It may appear blunt or direct. Medical documents are intended to carry relevant information, facts as evident, and the clinical opinion of the practitioner. This note may have been transcribed using a voice dictation system. Voice recognition errors may occur. This should not be taken to alter the content or meaning of this note.            [1] No Known Allergies

## 2024-10-22 NOTE — PAYOR COMM NOTE
--------------  DISCHARGE REVIEW    Payor: [facility]  Subscriber #:  [id number]  Authorization Number: [id number]    Admit date: 10/17/24  Admit time:   1:39 PM  Discharge Date: 10/21/2024  3:20 PM     Admitting Physician: Tracey Rodrigues MD  Attending Physician:  No att. providers found  Primary Care Physician: Pcp, None          Discharge Summary Notes        Discharge Summary signed by Christophe Wood MD at 10/21/2024  7:47 AM       Author: Christophe Wood MD Specialty: HOSPITALIST Author Type: Physician    Filed: 10/21/2024  7:47 AM Date of Service: 10/21/2024  7:45 AM Status: Signed    : Christophe Wood MD (Physician)           [facility]  part of [facility]     DISCHARGE SUMMARY     Srinath Tamayo Patient Status:  Inpatient    4/10/1985 MRN [id number]   Location [facility] 3W/SW Attending Christophe Wood MD   Hosp Day # 4 PCP No primary care provider on file.     DATE OF ADMISSION: 10/17/2024  DATE OF DISCHARGE:  10/21/24  DISPOSITION: home  CONDITION ON DISCHARGE: good    DISCHARGE DIAGNOSES:  Acute alcohol withdrawal  Alcohol abuse  Alcoholic gastritis  Abnormal LFTs    HISTORY OF PRESENT ILLNESS (COPIED FROM ADMISSION H&P)  Patient is a 39-year-old  male who presented to the emergency department for evaluation of midsternal chest pain. He has been on drinking binge since he was released from an alcohol rehab facility 3 days ago. He has been consuming, according to his report, around 1 gallon of vodka on a daily basis. Last drink was this morning. Today, came into the emergency department for alcohol withdrawal but instead he told the ER physician that he has chest pain and he had a stent in the past. Later on, he retreated his statement, and he was lying about the stent and the chest discomfort. CBC and chemistry, lipase and troponin were unremarkable except for leukocytosis of 15. Chest x-ray showed no acute findings. EKG showed normal sinus rhythm.     [facility]  COURSE:  Patient was admitted, placed on a detox protocol.  He actually required very little in the way of medications for this.  Seen by psychiatry.  Medications adjusted for anxiety.  Detox protocol discontinued by psychiatry.  He had mild alcoholic gastritis and mild elevation of his LFTs which both improved prior to discharge.  He will continued 2 weeks of PPI.  Counseled on alcohol abstinence    Patient understands return to the emergency room for increased pain, fever, discharge, shortness of breath, chest pain, new neurologic symptoms, other concerning symptoms.    PHYSICAL EXAM:  Temp:  [98.2 °F (36.8 °C)-98.5 °F (36.9 °C)] 98.3 °F (36.8 °C)  Pulse:  [60-71] 60  Resp:  [18] 18  BP: (118-144)/(79-96) 118/79  SpO2:  [93 %-96 %] 95 %  Gen: A+Ox3.  No distress.   HEENT: NCAT, neck supple, no carotid bruit.  CV: RRR, S1S2, and intact distal pulses. No gallop, rub, murmur.  Pulm: Effort and breath sounds normal. No distress, wheezes, rales, rhonchi.  Abd: Soft, NTND, BS normal, no mass, no HSM, no rebound/guarding.   Neuro: Normal reflexes, CN. Sensory/motor exams grossly normal deficit. Coordination  and gait normal.   MS: No joint effusions.  No peripheral edema.  Skin: Skin is warm and dry. No rashes, erythema, diaphoresis.   Psych: Normal mood and affect. Behavior and judgment normal.     DISCHARGE MEDICATIONS     Discharge Medications        START taking these medications        Instructions Prescription details   ARIPiprazole 5 MG Tabs  Commonly known as: Abilify      Take 1 tablet (5 mg total) by mouth daily.   Quantity: 30 tablet  Refills: 1     gabapentin 100 MG Caps  Commonly known as: Neurontin      Take 1 capsule (100 mg total) by mouth 3 (three) times daily.   Quantity: 90 capsule  Refills: 1     hydrOXYzine 25 MG Tabs  Commonly known as: Atarax      Take 1 tablet (25 mg total) by mouth daily as needed for Anxiety.   Quantity: 30 tablet  Refills: 1     hydrOXYzine 50 MG Tabs  Commonly known as:  Atarax      Take 1 tablet (50 mg total) by mouth at bedtime.   Quantity: 30 tablet  Refills: 1     pantoprazole 40 MG Tbec  Commonly known as: Protonix  Start taking on: October 22, 2024      Take 1 tablet (40 mg total) by mouth every morning before breakfast.   Quantity: 14 tablet  Refills: 0     QUEtiapine 100 MG Tabs  Commonly known as: SEROquel      Take 1 tablet (100 mg total) by mouth nightly.   Quantity: 30 tablet  Refills: 1            CONTINUE taking these medications        Instructions Prescription details   propranolol 10 MG Tabs  Commonly known as: Inderal      Take 1 tablet (10 mg total) by mouth 2 (two) times daily.   Refills: 0               Where to Get Your Medications        These medications were sent to [id number] - [facility] [address] [facility], [phone], [phone] [address] [address]      Phone: [phone]   ARIPiprazole 5 MG Tabs  gabapentin 100 MG Caps  hydrOXYzine 25 MG Tabs  hydrOXYzine 50 MG Tabs  pantoprazole 40 MG Tbec  QUEtiapine 100 MG Tabs         CONSULTANTS  Consultants         Provider   Role Specialty     Otoniel Christensen MD      Consulting Physician Psychiatry            FOLLOW UP:  PCP    Follow up in 1 week(s)  Post Discharge Followup    The above plan and follow-up instructions were reviewed with the patient and they verbalized understanding and agreement.  They understand to return to the emergency room for any concerning signs or symptoms.  Greater than 30 minutes spent on discharge.  -----------------------    Hospital Discharge Diagnoses: Acute alcohol withdrawal    Lace+ Score: 23  59-90 High Risk  29-58 Medium Risk  0-28   Low Risk.    TCM Follow-Up Recommendation:  LACE < 29: Low Risk of readmission after discharge from the hospital. No TCM follow-up needed.    Electronically signed by Christophe Wood MD on 10/21/2024  7:47 AM         REVIEWER COMMENTS